# Patient Record
Sex: FEMALE | NOT HISPANIC OR LATINO | Employment: OTHER | URBAN - METROPOLITAN AREA
[De-identification: names, ages, dates, MRNs, and addresses within clinical notes are randomized per-mention and may not be internally consistent; named-entity substitution may affect disease eponyms.]

---

## 2023-03-31 PROBLEM — E78.5 HYPERLIPIDEMIA: Status: ACTIVE | Noted: 2023-03-31

## 2023-04-28 ENCOUNTER — OFFICE VISIT (OUTPATIENT)
Dept: PRIMARY CARE | Facility: CLINIC | Age: 63
End: 2023-04-28
Payer: COMMERCIAL

## 2023-04-28 VITALS
SYSTOLIC BLOOD PRESSURE: 169 MMHG | HEIGHT: 61 IN | WEIGHT: 127 LBS | TEMPERATURE: 96.8 F | BODY MASS INDEX: 23.98 KG/M2 | HEART RATE: 79 BPM | DIASTOLIC BLOOD PRESSURE: 64 MMHG | OXYGEN SATURATION: 98 %

## 2023-04-28 DIAGNOSIS — J43.2 CENTRILOBULAR EMPHYSEMA (MULTI): ICD-10-CM

## 2023-04-28 DIAGNOSIS — E11.29 MICROALBUMINURIA DUE TO TYPE 2 DIABETES MELLITUS (MULTI): Primary | ICD-10-CM

## 2023-04-28 DIAGNOSIS — I73.9 PAD (PERIPHERAL ARTERY DISEASE) (CMS-HCC): ICD-10-CM

## 2023-04-28 DIAGNOSIS — I10 HYPERTENSION, UNSPECIFIED TYPE: ICD-10-CM

## 2023-04-28 DIAGNOSIS — R80.9 MICROALBUMINURIA DUE TO TYPE 2 DIABETES MELLITUS (MULTI): Primary | ICD-10-CM

## 2023-04-28 DIAGNOSIS — E78.2 MIXED HYPERLIPIDEMIA: ICD-10-CM

## 2023-04-28 PROBLEM — E55.9 VITAMIN D DEFICIENCY: Status: ACTIVE | Noted: 2023-04-28

## 2023-04-28 PROBLEM — E78.00 HYPERCHOLESTEROLEMIA: Status: ACTIVE | Noted: 2023-04-28

## 2023-04-28 PROBLEM — J44.9 CHRONIC OBSTRUCTIVE LUNG DISEASE (MULTI): Status: ACTIVE | Noted: 2018-05-10

## 2023-04-28 PROBLEM — F41.8 MIXED ANXIETY DEPRESSIVE DISORDER: Status: ACTIVE | Noted: 2023-04-28

## 2023-04-28 PROBLEM — I25.10 CAD S/P PERCUTANEOUS CORONARY ANGIOPLASTY: Status: ACTIVE | Noted: 2023-04-28

## 2023-04-28 PROBLEM — Z98.61 CAD S/P PERCUTANEOUS CORONARY ANGIOPLASTY: Status: ACTIVE | Noted: 2023-04-28

## 2023-04-28 PROBLEM — G89.29 CHRONIC LOW BACK PAIN: Status: ACTIVE | Noted: 2023-04-28

## 2023-04-28 PROBLEM — M54.50 CHRONIC LOW BACK PAIN: Status: ACTIVE | Noted: 2023-04-28

## 2023-04-28 LAB — POC HEMOGLOBIN A1C: 8 % (ref 4.2–6.5)

## 2023-04-28 PROCEDURE — 1159F MED LIST DOCD IN RCRD: CPT | Performed by: FAMILY MEDICINE

## 2023-04-28 PROCEDURE — 1160F RVW MEDS BY RX/DR IN RCRD: CPT | Performed by: FAMILY MEDICINE

## 2023-04-28 PROCEDURE — 3078F DIAST BP <80 MM HG: CPT | Performed by: FAMILY MEDICINE

## 2023-04-28 PROCEDURE — 1036F TOBACCO NON-USER: CPT | Performed by: FAMILY MEDICINE

## 2023-04-28 PROCEDURE — 3077F SYST BP >= 140 MM HG: CPT | Performed by: FAMILY MEDICINE

## 2023-04-28 PROCEDURE — 3066F NEPHROPATHY DOC TX: CPT | Performed by: FAMILY MEDICINE

## 2023-04-28 PROCEDURE — 99214 OFFICE O/P EST MOD 30 MIN: CPT | Performed by: FAMILY MEDICINE

## 2023-04-28 PROCEDURE — 83036 HEMOGLOBIN GLYCOSYLATED A1C: CPT | Performed by: FAMILY MEDICINE

## 2023-04-28 PROCEDURE — 4010F ACE/ARB THERAPY RXD/TAKEN: CPT | Performed by: FAMILY MEDICINE

## 2023-04-28 RX ORDER — GLIPIZIDE 10 MG/1
10 TABLET ORAL
Qty: 180 TABLET | Refills: 3 | Status: SHIPPED | OUTPATIENT
Start: 2023-04-28 | End: 2024-02-21 | Stop reason: SDUPTHER

## 2023-04-28 RX ORDER — NITROGLYCERIN 0.4 MG/1
TABLET SUBLINGUAL
COMMUNITY
Start: 2014-10-01

## 2023-04-28 RX ORDER — AMLODIPINE BESYLATE 10 MG/1
TABLET ORAL
COMMUNITY
Start: 2018-06-26 | End: 2023-04-28 | Stop reason: SDUPTHER

## 2023-04-28 RX ORDER — GLIPIZIDE 10 MG/1
TABLET ORAL
COMMUNITY
Start: 2014-09-03 | End: 2023-04-28 | Stop reason: SDUPTHER

## 2023-04-28 RX ORDER — ROSUVASTATIN CALCIUM 40 MG/1
40 TABLET, COATED ORAL DAILY
Qty: 90 TABLET | Refills: 3 | Status: SHIPPED | OUTPATIENT
Start: 2023-04-28 | End: 2024-02-21 | Stop reason: SDUPTHER

## 2023-04-28 RX ORDER — LISINOPRIL 20 MG/1
20 TABLET ORAL NIGHTLY
COMMUNITY
End: 2024-02-21 | Stop reason: ALTCHOICE

## 2023-04-28 RX ORDER — BLOOD SUGAR DIAGNOSTIC
STRIP MISCELLANEOUS
COMMUNITY
Start: 2020-02-14

## 2023-04-28 RX ORDER — EMPAGLIFLOZIN AND METFORMIN HYDROCHLORIDE 5; 1000 MG/1; MG/1
1 TABLET ORAL 2 TIMES DAILY
COMMUNITY
Start: 2019-04-01 | End: 2024-01-05

## 2023-04-28 RX ORDER — LISINOPRIL 40 MG/1
40 TABLET ORAL
COMMUNITY
Start: 2017-04-06 | End: 2023-04-28 | Stop reason: SDUPTHER

## 2023-04-28 RX ORDER — LISINOPRIL 40 MG/1
40 TABLET ORAL
Qty: 90 TABLET | Refills: 3 | Status: SHIPPED | OUTPATIENT
Start: 2023-04-28 | End: 2024-02-21 | Stop reason: SDUPTHER

## 2023-04-28 RX ORDER — BLOOD SUGAR DIAGNOSTIC
1 STRIP MISCELLANEOUS 2 TIMES DAILY
COMMUNITY
End: 2023-07-13

## 2023-04-28 RX ORDER — CLOPIDOGREL BISULFATE 75 MG/1
75 TABLET ORAL DAILY
COMMUNITY
End: 2024-01-24

## 2023-04-28 RX ORDER — AMLODIPINE BESYLATE 10 MG/1
10 TABLET ORAL DAILY
Qty: 90 TABLET | Refills: 3 | Status: SHIPPED | OUTPATIENT
Start: 2023-04-28 | End: 2024-02-21 | Stop reason: SDUPTHER

## 2023-04-28 RX ORDER — CARVEDILOL 12.5 MG/1
12.5 TABLET ORAL
Qty: 180 TABLET | Refills: 3 | Status: SHIPPED | OUTPATIENT
Start: 2023-04-28 | End: 2023-09-20 | Stop reason: SDUPTHER

## 2023-04-28 RX ORDER — LANCETS
EACH MISCELLANEOUS 2 TIMES DAILY
COMMUNITY
Start: 2022-07-16

## 2023-04-28 RX ORDER — DULAGLUTIDE 1.5 MG/.5ML
INJECTION, SOLUTION SUBCUTANEOUS
COMMUNITY
Start: 2020-03-31 | End: 2023-08-28 | Stop reason: SDUPTHER

## 2023-04-28 RX ORDER — CARVEDILOL 12.5 MG/1
TABLET ORAL
COMMUNITY
Start: 2018-03-05 | End: 2023-04-28 | Stop reason: SDUPTHER

## 2023-04-28 RX ORDER — ROSUVASTATIN CALCIUM 40 MG/1
40 TABLET, COATED ORAL DAILY
COMMUNITY
End: 2023-04-28 | Stop reason: SDUPTHER

## 2023-04-28 RX ORDER — ASPIRIN 81 MG/1
81 TABLET ORAL
COMMUNITY
Start: 2017-05-09

## 2023-04-28 ASSESSMENT — LIFESTYLE VARIABLES
HOW MANY STANDARD DRINKS CONTAINING ALCOHOL DO YOU HAVE ON A TYPICAL DAY: PATIENT DOES NOT DRINK
AUDIT-C TOTAL SCORE: 0
HOW OFTEN DO YOU HAVE SIX OR MORE DRINKS ON ONE OCCASION: NEVER
HOW OFTEN DO YOU HAVE A DRINK CONTAINING ALCOHOL: NEVER
SKIP TO QUESTIONS 9-10: 1

## 2023-04-28 ASSESSMENT — PATIENT HEALTH QUESTIONNAIRE - PHQ9
SUM OF ALL RESPONSES TO PHQ9 QUESTIONS 1 AND 2: 0
1. LITTLE INTEREST OR PLEASURE IN DOING THINGS: NOT AT ALL
2. FEELING DOWN, DEPRESSED OR HOPELESS: NOT AT ALL

## 2023-04-28 NOTE — PROGRESS NOTES
Subjective   Patient ID: Merissa English is a 63 y.o. female who presents for Results.    Assessment/Plan     Problem List Items Addressed This Visit          Respiratory    Chronic obstructive lung disease (CMS/HCC)       Circulatory    Hypertension    Relevant Medications    carvedilol (Coreg) 12.5 mg tablet    lisinopril 40 mg tablet    amLODIPine (Norvasc) 10 mg tablet    PAD (peripheral artery disease) (CMS/HCC)     Asymptomatic we will continue aspirin Plavix statin -hold off vascular surgery referral            Endocrine/Metabolic    Microalbuminuria due to type 2 diabetes mellitus (CMS/[facility]) - Primary    Relevant Medications    glipiZIDE (Glucotrol) 10 mg tablet    Other Relevant Orders    POCT glycosylated hemoglobin (Hb A1C) manually resulted       Other    Hyperlipidemia    Relevant Medications    rosuvastatin (Crestor) 40 mg tablet     Physical done in December 2022  Prevnar 20 previous visit  Repeat Cologuard patient never did  CT chest done in April 2023 reviewed stable finding of centrilobular emphysema  Mammogram in June 2021 within normal limits  New shingles vaccine prescription provided on previous visit  GI information provided 4 EGD colonoscopy patient did not go       Discussed about healthy lifestyle diet exercise  Continue current medication  Advised to follow-up with OB/GYN for Pap smear  Advised to follow-up with ophthalmology  I'll up in 3 months  Received flu vaccine     Schedule follow-up with OB/GYN for Pap smear    HPI    63-year-old female here for follow-up on     MARY reviewed showed  Right 0.76  Left 0.62    Discussed in detail patient is asymptomatic  Hemoglobin A1c 8.0 again discussed about lifestyle modification we will continue current treatment follow-up in 3 months if it is not well controlled increase Trulicity  Patient agrees     NSTEMi.  on aspirin, statin, beta blocker, lisinopril , plavix.   Echo showed ejection fraction 30-35 percent with lateral and apical wall akinesis in  2/18 - recent echo showed ef 50-55% - Lasix has been discontinued by patient - no edema no shortness of breath at this point     Cardiac catheter revealed status post mild LAD SAMUEL - on 2/27/2018,  hypertension. Continue carvedilol and lisinopril.  3. Hyperlipidemia. Continue Crestor 40 mg with fenofibrate.  4. Diabetes. cont metformin. Continue glipizide and synjardy and Trulicity  5. Generalized anxiety disorder, was on p.r.n. Xanax.       Hemoglobin A1c 7.7 on previous visit we will check today 8.0.  History of smoking more than 30 pack per year stopped in 2017     Patient stopped smoking  No hypoglycemia     Chronic constipation  Possible COPD emphysema  Anxiety depression  Cataract  Hearing loss  Wearing dentures  Patient never had a colonoscopy     No Known Allergies    Current Outpatient Medications   Medication Sig Dispense Refill    Accu-Chek Fastclix Lancet Drum misc 2 times a day.      Accu-Chek Guide test strips strip 1 strip 2 times a day.      aspirin 81 mg EC tablet Take 1 tablet (81 mg) by mouth once daily.      blood sugar diagnostic (Accu-Chek Nina Plus test strp) strip once daily.      clopidogrel (Plavix) 75 mg tablet Take 1 tablet (75 mg) by mouth once daily.      fenofibrate (Tricor) 145 mg tablet TAKE ONE TABLET BY MOUTH DAILY 90 tablet 1    lisinopril 20 mg tablet Take 1 tablet (20 mg) by mouth once daily at bedtime.      nitroglycerin (Nitrostat) 0.4 mg SL tablet Place under the tongue.      Synjardy 5-1,000 mg Take 1 tablet by mouth twice a day.      Trulicity 1.5 mg/0.5 mL pen injector Inject under the skin.      amLODIPine (Norvasc) 10 mg tablet Take 1 tablet (10 mg) by mouth once daily. 90 tablet 3    carvedilol (Coreg) 12.5 mg tablet Take 1 tablet (12.5 mg) by mouth 2 times a day with meals. 180 tablet 3    glipiZIDE (Glucotrol) 10 mg tablet Take 1 tablet (10 mg) by mouth 2 times a day before meals. 180 tablet 3    lisinopril 40 mg tablet Take 1 tablet (40 mg) by mouth once daily. 90  "tablet 3    rosuvastatin (Crestor) 40 mg tablet Take 1 tablet (40 mg) by mouth once daily. 90 tablet 3     No current facility-administered medications for this visit.       Objective   Visit Vitals  /64 (BP Location: Left arm, Patient Position: Sitting)   Pulse 79   Temp 36 °C (96.8 °F)   Ht 1.549 m (5' 1\")   Wt 57.6 kg (127 lb)   SpO2 98%   BMI 24.00 kg/m²   Smoking Status Former   BSA 1.57 m²     Physical Exam  Cardiovascular:      Pulses:           Dorsalis pedis pulses are 0 on the right side and 0 on the left side.        Posterior tibial pulses are 0 on the right side and 0 on the left side.   Musculoskeletal:      Right foot: Normal range of motion. No deformity, bunion or foot drop.      Left foot: Normal range of motion. No deformity, bunion or foot drop.   Feet:      Right foot:      Protective Sensation: 5 sites tested.  5 sites sensed.      Skin integrity: Skin integrity normal.      Toenail Condition: Right toenails are normal.      Left foot:      Protective Sensation: 5 sites tested.  5 sites sensed.      Skin integrity: Skin integrity normal.      Toenail Condition: Left toenails are normal.       Constitutional:       General: He is not in acute distress.     Appearance: Normal appearance.   HENT:      Head: Normocephalic and atraumatic.      Nose: Nose normal.   Eyes:      Extraocular Movements: Extraocular movements intact.      Conjunctiva/sclera: Conjunctivae normal.   Cardiovascular:      Rate and Rhythm: Normal rate and regular rhythm.   Pulmonary:      Effort: Pulmonary effort is normal.      Breath sounds: Normal breath sounds.   Skin:     General: Skin is warm.   Neurological:      Mental Status: He is alert and oriented to person, place, and time.   Psychiatric:         Mood and Affect: Mood normal.         Behavior: Behavior normal.   Immunization History   Administered Date(s) Administered    Moderna SARS-CoV-2 Vaccination 03/28/2021, 04/26/2021, 12/15/2021       Review of " Systems    No visits with results within 4 Month(s) from this visit.   Latest known visit with results is:   Legacy Encounter on 12/28/2022   Component Date Value Ref Range Status    TSH 12/28/2022 1.36  0.44 - 3.98 mIU/L Final    Cholesterol 12/28/2022 110  0 - 199 mg/dL Final    HDL 12/28/2022 35.9 (A)  mg/dL Final    Cholesterol/HDL Ratio 12/28/2022 3.1   Final    LDL 12/28/2022 41  0 - 99 mg/dL Final    VLDL 12/28/2022 34  0 - 40 mg/dL Final    Triglycerides 12/28/2022 168 (H)  0 - 149 mg/dL Final    Albumin 12/28/2022 4.0  3.4 - 5.0 g/dL Final    Total Bilirubin 12/28/2022 0.3  0.0 - 1.2 mg/dL Final    Bilirubin, Direct 12/28/2022 0.1  0.0 - 0.3 mg/dL Final    Alkaline Phosphatase 12/28/2022 41  33 - 136 U/L Final    ALT (SGPT) 12/28/2022 13  7 - 45 U/L Final    AST 12/28/2022 17  9 - 39 U/L Final    Total Protein 12/28/2022 6.9  6.4 - 8.2 g/dL Final       Radiology: Reviewed imaging in powerchart.  CT lung screening low dose    Result Date: 4/3/2023  CT  OF THE CHEST  CLINICAL INDICATION:  Former smoker. Screening. FINDINGS:  Serial axial images from the thoracic inlet to the upper abdomen were obtained without administration of contrast with low-dose screen technique.   Comparison is made to prior CT obtained on 7/6/2022. Coronal and sagittal reformatted images were generated from the axial data set. This exam was performed according to our departmental dose optimization program and includes the following measures when applicable: Automated exposure control, adjustment of the MAS and or KVP according to the patient size and are exam and an iterative reconstruction algorithm. The heart and mediastinal vascular structures are  again remarkable for the presence of calcified plaques in the coronary arteries and thoracic aorta..  There are no axillary, hilar or mediastinal abnormally enlarged lymph nodes.  There is no evidence of mediastinal masses. Again the lungs are hyperexpanded and hyperlucent with flattening  of the diaphragms and increased retrosternal space. . Small focal area of subsegmental atelectasis in the posterior right lung base is not identified.  There is no evidence of  pleural effusion.  The pulmonary vasculature is normal.  There are no intrapulmonary nodules or masses.  The airways are normal. However, the right lower lobe posterior basal segmental bronchi is completely filled and occluded most likely representing a mucous plug. This finding was not present on previous CT. Within the limitations of the exam the visualize segment of the upper abdomen is unremarkable. No evidence of osteoblastic or osteolytic lesions is identified in bones of the chest. Again mild osteoarthritic changes of the thoracic spine are noted. IMPRESSION: Stable findings of centrilobular emphysema. Occlusion of the posterior basal segmental bronchus of the right lower lobe most likely representing mucous plug. There is secondary small focal area of subsegmental atelectasis at the lung base. Clinical correlation is suggested to determine the significance of this finding. Stable calcified atherosclerosis of the coronary arteries and thoracic aorta. Nodules seen on previous CT in the right lower lobe, is no longer seen. Lung RADS category 1. Recommendations: Continue yearly screening with LD CT Electronically signed by:  Taylor Garay MD  4/5/2023 9:38 AM CDT Workstation: [id number] Technologist:  EMIGDIO DIETZ Dictated By:   TAYLOR GARAY MD Signed By:     TAYLOR GARAY MD Signed Out:    04/05/23 10:38:56      No family history on file.  Social History     Socioeconomic History    Marital status:      Spouse name: None    Number of children: None    Years of education: None    Highest education level: None   Occupational History    None   Tobacco Use    Smoking status: Former     Packs/day: 1.50     Years: 15.00     Pack years: 22.50     Types: Cigarettes    Smokeless tobacco: Never   Vaping Use    Vaping status: None   Substance  and Sexual Activity    Alcohol use: Never    Drug use: Never    Sexual activity: None   Other Topics Concern    None   Social History Narrative    None     Social Determinants of Health     Financial Resource Strain: Not on file   Food Insecurity: Not on file   Transportation Needs: Not on file   Physical Activity: Not on file   Stress: Not on file   Social Connections: Not on file   Intimate Partner Violence: Not on file   Housing Stability: Not on file     Past Medical History:   Diagnosis Date    Unspecified systolic (congestive) heart failure (CMS/HCC) 2018    Systolic CHF     Past Surgical History:   Procedure Laterality Date    CATARACT EXTRACTION  2016    Cataract Surgery     SECTION, CLASSIC  10/29/2014     Section    CORONARY ANGIOPLASTY WITH STENT PLACEMENT  2018    Cath Placement Of Stent 1    LYMPH NODE BIOPSY  2016    Biopsy Lymph Node       Charting was completed using voice recognition technology and may include unintended errors.

## 2023-07-05 DIAGNOSIS — R80.9 MICROALBUMINURIA DUE TO TYPE 2 DIABETES MELLITUS (MULTI): ICD-10-CM

## 2023-07-05 DIAGNOSIS — E11.29 MICROALBUMINURIA DUE TO TYPE 2 DIABETES MELLITUS (MULTI): ICD-10-CM

## 2023-07-13 RX ORDER — BLOOD SUGAR DIAGNOSTIC
STRIP MISCELLANEOUS
Qty: 200 STRIP | Refills: 3 | Status: SHIPPED | OUTPATIENT
Start: 2023-07-13 | End: 2023-07-21

## 2023-07-19 DIAGNOSIS — E11.29 MICROALBUMINURIA DUE TO TYPE 2 DIABETES MELLITUS (MULTI): ICD-10-CM

## 2023-07-19 DIAGNOSIS — R80.9 MICROALBUMINURIA DUE TO TYPE 2 DIABETES MELLITUS (MULTI): ICD-10-CM

## 2023-07-21 RX ORDER — BLOOD SUGAR DIAGNOSTIC
STRIP MISCELLANEOUS
Qty: 100 STRIP | Refills: 3 | Status: SHIPPED | OUTPATIENT
Start: 2023-07-21

## 2023-07-28 ENCOUNTER — APPOINTMENT (OUTPATIENT)
Dept: PRIMARY CARE | Facility: CLINIC | Age: 63
End: 2023-07-28
Payer: COMMERCIAL

## 2023-08-02 ENCOUNTER — OFFICE VISIT (OUTPATIENT)
Dept: PRIMARY CARE | Facility: CLINIC | Age: 63
End: 2023-08-02
Payer: COMMERCIAL

## 2023-08-02 VITALS
HEIGHT: 61 IN | DIASTOLIC BLOOD PRESSURE: 70 MMHG | BODY MASS INDEX: 23.41 KG/M2 | OXYGEN SATURATION: 97 % | WEIGHT: 124 LBS | SYSTOLIC BLOOD PRESSURE: 169 MMHG | TEMPERATURE: 96.9 F | HEART RATE: 73 BPM

## 2023-08-02 DIAGNOSIS — Z98.61 CAD S/P PERCUTANEOUS CORONARY ANGIOPLASTY: ICD-10-CM

## 2023-08-02 DIAGNOSIS — I10 BENIGN ESSENTIAL HYPERTENSION: ICD-10-CM

## 2023-08-02 DIAGNOSIS — I25.10 CAD S/P PERCUTANEOUS CORONARY ANGIOPLASTY: ICD-10-CM

## 2023-08-02 DIAGNOSIS — E11.29 MICROALBUMINURIA DUE TO TYPE 2 DIABETES MELLITUS (MULTI): Primary | ICD-10-CM

## 2023-08-02 DIAGNOSIS — R80.9 MICROALBUMINURIA DUE TO TYPE 2 DIABETES MELLITUS (MULTI): Primary | ICD-10-CM

## 2023-08-02 DIAGNOSIS — I73.9 PAD (PERIPHERAL ARTERY DISEASE) (CMS-HCC): ICD-10-CM

## 2023-08-02 LAB — POC HEMOGLOBIN A1C: 7.6 % (ref 4.2–6.5)

## 2023-08-02 PROCEDURE — 4010F ACE/ARB THERAPY RXD/TAKEN: CPT | Performed by: FAMILY MEDICINE

## 2023-08-02 PROCEDURE — 3066F NEPHROPATHY DOC TX: CPT | Performed by: FAMILY MEDICINE

## 2023-08-02 PROCEDURE — 99214 OFFICE O/P EST MOD 30 MIN: CPT | Performed by: FAMILY MEDICINE

## 2023-08-02 PROCEDURE — 1160F RVW MEDS BY RX/DR IN RCRD: CPT | Performed by: FAMILY MEDICINE

## 2023-08-02 PROCEDURE — 1036F TOBACCO NON-USER: CPT | Performed by: FAMILY MEDICINE

## 2023-08-02 PROCEDURE — 1159F MED LIST DOCD IN RCRD: CPT | Performed by: FAMILY MEDICINE

## 2023-08-02 PROCEDURE — 83036 HEMOGLOBIN GLYCOSYLATED A1C: CPT | Performed by: FAMILY MEDICINE

## 2023-08-02 PROCEDURE — 3078F DIAST BP <80 MM HG: CPT | Performed by: FAMILY MEDICINE

## 2023-08-02 PROCEDURE — 3077F SYST BP >= 140 MM HG: CPT | Performed by: FAMILY MEDICINE

## 2023-08-02 RX ORDER — IRON POLYSACCHARIDE COMPLEX 150 MG
CAPSULE ORAL
COMMUNITY
Start: 2018-09-27 | End: 2024-01-18

## 2023-08-02 ASSESSMENT — LIFESTYLE VARIABLES
HOW OFTEN DO YOU HAVE SIX OR MORE DRINKS ON ONE OCCASION: NEVER
HOW MANY STANDARD DRINKS CONTAINING ALCOHOL DO YOU HAVE ON A TYPICAL DAY: PATIENT DOES NOT DRINK
HOW OFTEN DO YOU HAVE A DRINK CONTAINING ALCOHOL: NEVER
SKIP TO QUESTIONS 9-10: 1
AUDIT-C TOTAL SCORE: 0

## 2023-08-02 NOTE — PROGRESS NOTES
Subjective   Patient ID: Merissa English is a 63 y.o. female who presents for Follow-up and Diabetes.    Assessment/Plan     Problem List Items Addressed This Visit       Benign essential hypertension    CAD S/P percutaneous coronary angioplasty    Microalbuminuria due to type 2 diabetes mellitus (CMS/HCC) - Primary    Relevant Orders    POCT glycosylated hemoglobin (Hb A1C) manually resulted (Completed)    PAD (peripheral artery disease) (CMS/[facility])   Physical done in December 2022  Prevnar 20 previous visit  Cologuard was negative 4/23  CT chest done in April 2023 reviewed stable finding of centrilobular emphysema  Mammogram in June 2021 within normal limits  New shingles vaccine prescription provided on previous visit  GI information provided 4 EGD colonoscopy patient did not go       Discussed about healthy lifestyle diet exercise  Continue current medication  Advised to follow-up with OB/GYN for Pap smear  Advised to follow-up with ophthalmology  I'll up in 3 months  Received flu vaccine     Schedule follow-up with OB/GYN for Pap smear    HPI    63-year-old female here for follow-up on     MARY reviewed showed  Right 0.76  Left 0.62    Discussed in detail patient is asymptomatic - med mx - no ref for now  Hemoglobin A1c 8.0-->7.6 again discussed about lifestyle modification we will continue current treatment follow-up in 3 months if it is not well controlled increase Trulicity  Patient agrees    Reminded her to see opthal     NSTEMi.  on aspirin, statin, beta blocker, lisinopril , plavix.   Echo showed ejection fraction 30-35 percent with lateral and apical wall akinesis in 2/18 - recent echo showed ef 50-55% - Lasix has been discontinued by patient - no edema no shortness of breath at this point     Cardiac catheter revealed status post mild LAD SAMUEL - on 2/27/2018,  hypertension. Continue carvedilol and lisinopril.  3. Hyperlipidemia. Continue Crestor 40 mg with fenofibrate.  4. Diabetes. cont metformin. Continue  "glipizide and synjardy and Trulicity  5. Generalized anxiety disorder, was on p.r.n. Xanax.      History of smoking more than 30 pack per year stopped in 2017     No hypoglycemia     Chronic constipation  Possible COPD emphysema  Anxiety depression  Cataract  Hearing loss  Wearing dentures       No Known Allergies    Current Outpatient Medications   Medication Sig Dispense Refill    Accu-Chek Fastclix Lancet Drum misc 2 times a day.      Accu-Chek Guide test strips strip USE 1 STRIP TWICE DAILY 100 strip 3    amLODIPine (Norvasc) 10 mg tablet Take 1 tablet (10 mg) by mouth once daily. 90 tablet 3    aspirin 81 mg EC tablet Take 1 tablet (81 mg) by mouth once daily.      blood sugar diagnostic (Accu-Chek Nina Plus test strp) strip once daily.      carvedilol (Coreg) 12.5 mg tablet Take 1 tablet (12.5 mg) by mouth 2 times a day with meals. 180 tablet 3    clopidogrel (Plavix) 75 mg tablet Take 1 tablet (75 mg) by mouth once daily.      fenofibrate (Tricor) 145 mg tablet TAKE ONE TABLET BY MOUTH DAILY 90 tablet 1    glipiZIDE (Glucotrol) 10 mg tablet Take 1 tablet (10 mg) by mouth 2 times a day before meals. 180 tablet 3    iron polysaccharides (Nu-Iron,Niferex) 150 mg iron capsule Take by mouth.      lisinopril 20 mg tablet Take 1 tablet (20 mg) by mouth once daily at bedtime.      lisinopril 40 mg tablet Take 1 tablet (40 mg) by mouth once daily. 90 tablet 3    nitroglycerin (Nitrostat) 0.4 mg SL tablet Place under the tongue.      rosuvastatin (Crestor) 40 mg tablet Take 1 tablet (40 mg) by mouth once daily. 90 tablet 3    Synjardy 5-1,000 mg Take 1 tablet by mouth twice a day.      Trulicity 1.5 mg/0.5 mL pen injector Inject under the skin.       No current facility-administered medications for this visit.       Objective   Visit Vitals  /70 (BP Location: Left arm, Patient Position: Sitting)   Pulse 73   Temp 36.1 °C (96.9 °F)   Ht 1.549 m (5' 1\")   Wt 56.2 kg (124 lb)   SpO2 97%   BMI 23.43 kg/m²   Smoking " Status Former   BSA 1.56 m²     Physical Exam  Cardiovascular:      Pulses:           Dorsalis pedis pulses are 0 on the right side and 0 on the left side.        Posterior tibial pulses are 0 on the right side and 0 on the left side.   Musculoskeletal:      Right foot: Normal range of motion. No deformity, bunion or foot drop.      Left foot: Normal range of motion. No deformity, bunion or foot drop.   Feet:      Right foot:      Protective Sensation: 5 sites tested.  5 sites sensed.      Skin integrity: Skin integrity normal.      Toenail Condition: Right toenails are normal.      Left foot:      Protective Sensation: 5 sites tested.  5 sites sensed.      Skin integrity: Skin integrity normal.      Toenail Condition: Left toenails are normal.       Constitutional:       General: He is not in acute distress.     Appearance: Normal appearance.   HENT:      Head: Normocephalic and atraumatic.      Nose: Nose normal.   Eyes:      Extraocular Movements: Extraocular movements intact.      Conjunctiva/sclera: Conjunctivae normal.   Cardiovascular:      Rate and Rhythm: Normal rate and regular rhythm.   Pulmonary:      Effort: Pulmonary effort is normal.      Breath sounds: Normal breath sounds.   Skin:     General: Skin is warm.   Neurological:      Mental Status: He is alert and oriented to person, place, and time.   Psychiatric:         Mood and Affect: Mood normal.         Behavior: Behavior normal.   Immunization History   Administered Date(s) Administered    Moderna SARS-CoV-2 Vaccination 03/28/2021, 04/26/2021, 12/15/2021       Review of Systems    Office Visit on 08/02/2023   Component Date Value Ref Range Status    POC HEMOGLOBIN A1c 08/02/2023 7.6 (A)  4.2 - 6.5 % Final   Office Visit on 04/28/2023   Component Date Value Ref Range Status    POC HEMOGLOBIN A1c 04/28/2023 8.0 (A)  4.2 - 6.5 % Final       Radiology: Reviewed imaging in powerchart.  CT lung screening low dose    Result Date: 4/3/2023  CT  OF THE  CHEST  CLINICAL INDICATION:  Former smoker. Screening. FINDINGS:  Serial axial images from the thoracic inlet to the upper abdomen were obtained without administration of contrast with low-dose screen technique.   Comparison is made to prior CT obtained on 7/6/2022. Coronal and sagittal reformatted images were generated from the axial data set. This exam was performed according to our departmental dose optimization program and includes the following measures when applicable: Automated exposure control, adjustment of the MAS and or KVP according to the patient size and are exam and an iterative reconstruction algorithm. The heart and mediastinal vascular structures are  again remarkable for the presence of calcified plaques in the coronary arteries and thoracic aorta..  There are no axillary, hilar or mediastinal abnormally enlarged lymph nodes.  There is no evidence of mediastinal masses. Again the lungs are hyperexpanded and hyperlucent with flattening of the diaphragms and increased retrosternal space. . Small focal area of subsegmental atelectasis in the posterior right lung base is not identified.  There is no evidence of  pleural effusion.  The pulmonary vasculature is normal.  There are no intrapulmonary nodules or masses.  The airways are normal. However, the right lower lobe posterior basal segmental bronchi is completely filled and occluded most likely representing a mucous plug. This finding was not present on previous CT. Within the limitations of the exam the visualize segment of the upper abdomen is unremarkable. No evidence of osteoblastic or osteolytic lesions is identified in bones of the chest. Again mild osteoarthritic changes of the thoracic spine are noted. IMPRESSION: Stable findings of centrilobular emphysema. Occlusion of the posterior basal segmental bronchus of the right lower lobe most likely representing mucous plug. There is secondary small focal area of subsegmental atelectasis at the  lung base. Clinical correlation is suggested to determine the significance of this finding. Stable calcified atherosclerosis of the coronary arteries and thoracic aorta. Nodules seen on previous CT in the right lower lobe, is no longer seen. Lung RADS category 1. Recommendations: Continue yearly screening with LD CT Electronically signed by:  Taylor Garay MD  2023 9:38 AM CDT Workstation: [id number] Technologist:  EMIGDIO DIETZ Dictated By:   TAYLOR GARAY MD Signed By:     TAYLOR GARAY MD Signed Out:    23 10:38:56      No family history on file.  Social History     Socioeconomic History    Marital status:      Spouse name: None    Number of children: None    Years of education: None    Highest education level: None   Occupational History    None   Tobacco Use    Smoking status: Former     Packs/day: 1.50     Years: 15.00     Total pack years: 22.50     Types: Cigarettes    Smokeless tobacco: Never   Substance and Sexual Activity    Alcohol use: Never    Drug use: Never    Sexual activity: None   Other Topics Concern    None   Social History Narrative    None     Social Determinants of Health     Financial Resource Strain: Not on file   Food Insecurity: Not on file   Transportation Needs: Not on file   Physical Activity: Not on file   Stress: Not on file   Social Connections: Not on file   Intimate Partner Violence: Not on file   Housing Stability: Not on file     Past Medical History:   Diagnosis Date    Unspecified systolic (congestive) heart failure (CMS/HCC) 2018    Systolic CHF     Past Surgical History:   Procedure Laterality Date    CATARACT EXTRACTION  2016    Cataract Surgery     SECTION, CLASSIC  10/29/2014     Section    CORONARY ANGIOPLASTY WITH STENT PLACEMENT  2018    Cath Placement Of Stent 1    LYMPH NODE BIOPSY  2016    Biopsy Lymph Node       Charting was completed using voice recognition technology and may include unintended errors.

## 2023-08-28 DIAGNOSIS — R80.9 MICROALBUMINURIA DUE TO TYPE 2 DIABETES MELLITUS (MULTI): ICD-10-CM

## 2023-08-28 DIAGNOSIS — E11.29 MICROALBUMINURIA DUE TO TYPE 2 DIABETES MELLITUS (MULTI): ICD-10-CM

## 2023-08-29 RX ORDER — DULAGLUTIDE 1.5 MG/.5ML
1.5 INJECTION, SOLUTION SUBCUTANEOUS
Qty: 12 EACH | Refills: 2 | Status: SHIPPED | OUTPATIENT
Start: 2023-08-29 | End: 2024-05-21

## 2023-09-20 DIAGNOSIS — E78.5 HYPERLIPIDEMIA, UNSPECIFIED HYPERLIPIDEMIA TYPE: ICD-10-CM

## 2023-09-20 DIAGNOSIS — I10 HYPERTENSION, UNSPECIFIED TYPE: ICD-10-CM

## 2023-09-21 ENCOUNTER — TELEPHONE (OUTPATIENT)
Dept: PRIMARY CARE | Facility: CLINIC | Age: 63
End: 2023-09-21
Payer: COMMERCIAL

## 2023-09-21 DIAGNOSIS — I10 HYPERTENSION, UNSPECIFIED TYPE: ICD-10-CM

## 2023-09-21 RX ORDER — CARVEDILOL 12.5 MG/1
12.5 TABLET ORAL
Qty: 180 TABLET | Refills: 2 | Status: SHIPPED | OUTPATIENT
Start: 2023-09-21 | End: 2023-09-21 | Stop reason: SDUPTHER

## 2023-09-21 RX ORDER — CARVEDILOL 12.5 MG/1
12.5 TABLET ORAL
Qty: 180 TABLET | Refills: 2 | Status: SHIPPED | OUTPATIENT
Start: 2023-09-21 | End: 2024-05-16 | Stop reason: SDUPTHER

## 2023-09-21 RX ORDER — FENOFIBRATE 145 MG/1
TABLET, FILM COATED ORAL
Qty: 90 TABLET | Refills: 2 | Status: SHIPPED | OUTPATIENT
Start: 2023-09-21

## 2023-10-09 ENCOUNTER — TELEMEDICINE (OUTPATIENT)
Dept: PRIMARY CARE | Facility: CLINIC | Age: 63
End: 2023-10-09
Payer: COMMERCIAL

## 2023-10-09 VITALS — BODY MASS INDEX: 22.84 KG/M2 | HEIGHT: 61 IN | WEIGHT: 121 LBS | TEMPERATURE: 100 F

## 2023-10-09 DIAGNOSIS — U07.1 COVID-19 VIRUS DETECTED: ICD-10-CM

## 2023-10-09 DIAGNOSIS — R05.1 ACUTE COUGH: Primary | ICD-10-CM

## 2023-10-09 PROCEDURE — 99213 OFFICE O/P EST LOW 20 MIN: CPT | Performed by: FAMILY MEDICINE

## 2023-10-09 NOTE — PROGRESS NOTES
0Subjective   Patient ID: Merissa English is a 63 y.o. female who presents for Covid-19 Home Monitoring Video Visit (Cough,achey,fever sxs started last night /Pt  also has covid).    Assessment/Plan     Problem List Items Addressed This Visit    None  Visit Diagnoses       Acute cough    -  Primary    Relevant Medications    nirmatrelvir-ritonavir (PAXLOVID) 300 mg (150 mg x 2)-100 mg tablet therapy pack    COVID-19 virus detected        Relevant Medications    nirmatrelvir-ritonavir (PAXLOVID) 300 mg (150 mg x 2)-100 mg tablet therapy pack            HPI    This visit was completed via VIRTUAL VIDEO/Audio due to the restrictions of the COVID-19 pandemic. All issues as below were discussed and addressed but no physical exam was performed. If it was felt that the patient should be evaluated in clinic then they were directed there. The patient verbally consented to visit.     Patient was not able to do the video call so phone call was made    63-year-old female complaining of cough congestion phlegm production runny nose sore throat fever started yesterday fever was more than 100 degrees  Denies any chest pain or shortness of breath no nausea vomiting diarrhea  Patient's  was also positive for COVID-19 infection  Complaining of phlegm production    Taking Paxil discussed  Hold statin  Monitor oxygenation if worsening let us know  Stay hydrated  Follow-up in the office if symptoms are worsening    No Known Allergies    Current Outpatient Medications   Medication Sig Dispense Refill    Accu-Chek Fastclix Lancet Drum misc 2 times a day.      Accu-Chek Guide test strips strip USE 1 STRIP TWICE DAILY 100 strip 3    amLODIPine (Norvasc) 10 mg tablet Take 1 tablet (10 mg) by mouth once daily. 90 tablet 3    aspirin 81 mg EC tablet Take 1 tablet (81 mg) by mouth once daily.      blood sugar diagnostic (Accu-Chek Nina Plus test strp) strip once daily.      carvedilol (Coreg) 12.5 mg tablet Take 1 tablet (12.5 mg)  "by mouth 2 times a day with meals. 180 tablet 2    dulaglutide (Trulicity) 1.5 mg/0.5 mL pen injector injection Inject 1.5 mg under the skin 1 (one) time per week. 12 each 2    fenofibrate (Tricor) 145 mg tablet TAKE ONE TABLET BY MOUTH DAILY 90 tablet 2    glipiZIDE (Glucotrol) 10 mg tablet Take 1 tablet (10 mg) by mouth 2 times a day before meals. 180 tablet 3    iron polysaccharides (Nu-Iron,Niferex) 150 mg iron capsule Take by mouth.      lisinopril 20 mg tablet Take 1 tablet (20 mg) by mouth once daily at bedtime.      lisinopril 40 mg tablet Take 1 tablet (40 mg) by mouth once daily. 90 tablet 3    nitroglycerin (Nitrostat) 0.4 mg SL tablet Place under the tongue.      rosuvastatin (Crestor) 40 mg tablet Take 1 tablet (40 mg) by mouth once daily. 90 tablet 3    Synjardy 5-1,000 mg Take 1 tablet by mouth twice a day.      clopidogrel (Plavix) 75 mg tablet Take 1 tablet (75 mg) by mouth once daily.      nirmatrelvir-ritonavir (PAXLOVID) 300 mg (150 mg x 2)-100 mg tablet therapy pack Take 3 tablets by mouth 2 times a day for 5 days. Follow the instructions on the package 30 tablet 0     No current facility-administered medications for this visit.       Objective   Visit Vitals  Temp 37.8 °C (100 °F)   Ht 1.549 m (5' 1\")   Wt 54.9 kg (121 lb)   BMI 22.86 kg/m²   Smoking Status Former   BSA 1.54 m²     Physical Exam    Immunization History   Administered Date(s) Administered    Moderna SARS-CoV-2 Vaccination 03/28/2021, 04/26/2021, 12/15/2021       Review of Systems    Office Visit on 08/02/2023   Component Date Value Ref Range Status    POC HEMOGLOBIN A1c 08/02/2023 7.6 (A)  4.2 - 6.5 % Final       Radiology: Reviewed imaging in powerchart.  No results found.    No family history on file.  Social History     Socioeconomic History    Marital status:      Spouse name: None    Number of children: None    Years of education: None    Highest education level: None   Occupational History    None   Tobacco Use    " Smoking status: Former     Packs/day: 1.50     Years: 15.00     Additional pack years: 0.00     Total pack years: 22.50     Types: Cigarettes    Smokeless tobacco: Never   Substance and Sexual Activity    Alcohol use: Never    Drug use: Never    Sexual activity: None   Other Topics Concern    None   Social History Narrative    None     Social Determinants of Health     Financial Resource Strain: Not on file   Food Insecurity: Not on file   Transportation Needs: Not on file   Physical Activity: Not on file   Stress: Not on file   Social Connections: Not on file   Intimate Partner Violence: Not on file   Housing Stability: Not on file     Past Medical History:   Diagnosis Date    Unspecified systolic (congestive) heart failure (CMS/[facility]) 2018    Systolic CHF     Past Surgical History:   Procedure Laterality Date    CATARACT EXTRACTION  2016    Cataract Surgery     SECTION, CLASSIC  10/29/2014     Section    CORONARY ANGIOPLASTY WITH STENT PLACEMENT  2018    Cath Placement Of Stent 1    LYMPH NODE BIOPSY  2016    Biopsy Lymph Node       Charting was completed using voice recognition technology and may include unintended errors.

## 2024-01-05 DIAGNOSIS — E11.29 MICROALBUMINURIA DUE TO TYPE 2 DIABETES MELLITUS (MULTI): ICD-10-CM

## 2024-01-05 DIAGNOSIS — R80.9 MICROALBUMINURIA DUE TO TYPE 2 DIABETES MELLITUS (MULTI): ICD-10-CM

## 2024-01-05 RX ORDER — EMPAGLIFLOZIN AND METFORMIN HYDROCHLORIDE 5; 1000 MG/1; MG/1
1 TABLET ORAL 2 TIMES DAILY
Qty: 180 TABLET | Refills: 1 | Status: SHIPPED | OUTPATIENT
Start: 2024-01-05 | End: 2024-02-21 | Stop reason: SDUPTHER

## 2024-01-13 DIAGNOSIS — D50.9 IRON DEFICIENCY ANEMIA, UNSPECIFIED IRON DEFICIENCY ANEMIA TYPE: Primary | ICD-10-CM

## 2024-01-18 RX ORDER — IRON POLYSACCHARIDE COMPLEX 150 MG
150 CAPSULE ORAL 2 TIMES DAILY
Qty: 180 CAPSULE | Refills: 1 | Status: SHIPPED | OUTPATIENT
Start: 2024-01-18

## 2024-01-22 DIAGNOSIS — I73.9 PAD (PERIPHERAL ARTERY DISEASE) (CMS-HCC): ICD-10-CM

## 2024-01-24 RX ORDER — CLOPIDOGREL BISULFATE 75 MG/1
75 TABLET ORAL DAILY
Qty: 90 TABLET | Refills: 0 | Status: SHIPPED | OUTPATIENT
Start: 2024-01-24 | End: 2024-02-21 | Stop reason: SDUPTHER

## 2024-02-21 ENCOUNTER — OFFICE VISIT (OUTPATIENT)
Dept: PRIMARY CARE | Facility: CLINIC | Age: 64
End: 2024-02-21
Payer: COMMERCIAL

## 2024-02-21 ENCOUNTER — HOSPITAL ENCOUNTER (OUTPATIENT)
Dept: RADIOLOGY | Facility: EXTERNAL LOCATION | Age: 64
Discharge: HOME | End: 2024-02-21

## 2024-02-21 VITALS
TEMPERATURE: 96.7 F | SYSTOLIC BLOOD PRESSURE: 160 MMHG | HEART RATE: 71 BPM | HEIGHT: 61 IN | DIASTOLIC BLOOD PRESSURE: 70 MMHG | WEIGHT: 125 LBS | OXYGEN SATURATION: 96 % | BODY MASS INDEX: 23.6 KG/M2

## 2024-02-21 DIAGNOSIS — J43.2 CENTRILOBULAR EMPHYSEMA (MULTI): ICD-10-CM

## 2024-02-21 DIAGNOSIS — E11.29 MICROALBUMINURIA DUE TO TYPE 2 DIABETES MELLITUS (MULTI): ICD-10-CM

## 2024-02-21 DIAGNOSIS — E78.2 MIXED HYPERLIPIDEMIA: ICD-10-CM

## 2024-02-21 DIAGNOSIS — I10 HYPERTENSION, UNSPECIFIED TYPE: ICD-10-CM

## 2024-02-21 DIAGNOSIS — Z00.00 VISIT FOR PREVENTIVE HEALTH EXAMINATION: Primary | ICD-10-CM

## 2024-02-21 DIAGNOSIS — Z12.31 ENCOUNTER FOR SCREENING MAMMOGRAM FOR MALIGNANT NEOPLASM OF BREAST: ICD-10-CM

## 2024-02-21 DIAGNOSIS — I10 BENIGN ESSENTIAL HYPERTENSION: ICD-10-CM

## 2024-02-21 DIAGNOSIS — R80.9 MICROALBUMINURIA DUE TO TYPE 2 DIABETES MELLITUS (MULTI): ICD-10-CM

## 2024-02-21 DIAGNOSIS — I73.9 PAD (PERIPHERAL ARTERY DISEASE) (CMS-HCC): ICD-10-CM

## 2024-02-21 DIAGNOSIS — I65.23 ASYMPTOMATIC BILATERAL CAROTID ARTERY STENOSIS: ICD-10-CM

## 2024-02-21 LAB — POC HEMOGLOBIN A1C: 8.4 % (ref 4.2–6.5)

## 2024-02-21 PROCEDURE — 4010F ACE/ARB THERAPY RXD/TAKEN: CPT | Performed by: FAMILY MEDICINE

## 2024-02-21 PROCEDURE — 1036F TOBACCO NON-USER: CPT | Performed by: FAMILY MEDICINE

## 2024-02-21 PROCEDURE — 3077F SYST BP >= 140 MM HG: CPT | Performed by: FAMILY MEDICINE

## 2024-02-21 PROCEDURE — 99397 PER PM REEVAL EST PAT 65+ YR: CPT | Performed by: FAMILY MEDICINE

## 2024-02-21 PROCEDURE — 1160F RVW MEDS BY RX/DR IN RCRD: CPT | Performed by: FAMILY MEDICINE

## 2024-02-21 PROCEDURE — 99214 OFFICE O/P EST MOD 30 MIN: CPT | Performed by: FAMILY MEDICINE

## 2024-02-21 PROCEDURE — 83036 HEMOGLOBIN GLYCOSYLATED A1C: CPT | Performed by: FAMILY MEDICINE

## 2024-02-21 PROCEDURE — 3078F DIAST BP <80 MM HG: CPT | Performed by: FAMILY MEDICINE

## 2024-02-21 PROCEDURE — 1159F MED LIST DOCD IN RCRD: CPT | Performed by: FAMILY MEDICINE

## 2024-02-21 RX ORDER — ERGOCALCIFEROL 1.25 MG/1
1 CAPSULE ORAL
COMMUNITY
Start: 2023-12-28

## 2024-02-21 RX ORDER — EMPAGLIFLOZIN AND METFORMIN HYDROCHLORIDE 5; 1000 MG/1; MG/1
1 TABLET ORAL 2 TIMES DAILY
Qty: 180 TABLET | Refills: 2 | Status: CANCELLED | OUTPATIENT
Start: 2024-02-21

## 2024-02-21 RX ORDER — AMLODIPINE BESYLATE 10 MG/1
10 TABLET ORAL DAILY
Qty: 90 TABLET | Refills: 2 | Status: SHIPPED | OUTPATIENT
Start: 2024-02-21 | End: 2025-02-20

## 2024-02-21 RX ORDER — CLOPIDOGREL BISULFATE 75 MG/1
75 TABLET ORAL DAILY
Qty: 90 TABLET | Refills: 2 | Status: SHIPPED | OUTPATIENT
Start: 2024-02-21

## 2024-02-21 RX ORDER — LISINOPRIL 40 MG/1
40 TABLET ORAL
Qty: 90 TABLET | Refills: 2 | Status: SHIPPED | OUTPATIENT
Start: 2024-02-21 | End: 2025-02-20

## 2024-02-21 RX ORDER — EMPAGLIFLOZIN AND METFORMIN HYDROCHLORIDE 5; 1000 MG/1; MG/1
1 TABLET ORAL 2 TIMES DAILY
Qty: 180 TABLET | Refills: 1
Start: 2024-02-21

## 2024-02-21 RX ORDER — GLIPIZIDE 10 MG/1
10 TABLET ORAL
Qty: 180 TABLET | Refills: 2 | Status: SHIPPED | OUTPATIENT
Start: 2024-02-21 | End: 2025-02-20

## 2024-02-21 RX ORDER — ROSUVASTATIN CALCIUM 40 MG/1
40 TABLET, COATED ORAL DAILY
Qty: 90 TABLET | Refills: 2 | Status: SHIPPED | OUTPATIENT
Start: 2024-02-21 | End: 2025-02-20

## 2024-02-21 NOTE — PROGRESS NOTES
Subjective   Patient ID: Merissa English is a 64 y.o. female who presents for Annual Exam.    Assessment/Plan     Problem List Items Addressed This Visit       Hyperlipidemia    Relevant Medications    rosuvastatin (Crestor) 40 mg tablet    Benign essential hypertension    Chronic obstructive lung disease (CMS/HCC)    Hypertension    Relevant Medications    amLODIPine (Norvasc) 10 mg tablet    lisinopril 40 mg tablet    Other Relevant Orders    TSH with reflex to Free T4 if abnormal    Lipid Panel    Comprehensive Metabolic Panel    CBC    Urinalysis with Reflex Microscopic    Albumin , Urine Random    Microalbuminuria due to type 2 diabetes mellitus (CMS/HCC)    Relevant Medications    glipiZIDE (Glucotrol) 10 mg tablet    empagliflozin-metformin (Synjardy) 5-1,000 mg    Other Relevant Orders    POCT glycosylated hemoglobin (Hb A1C) manually resulted    TSH with reflex to Free T4 if abnormal    Lipid Panel    Comprehensive Metabolic Panel    CBC    Urinalysis with Reflex Microscopic    Albumin , Urine Random    PAD (peripheral artery disease) (CMS/HCC)    Relevant Medications    clopidogrel (Plavix) 75 mg tablet    Visit for preventive health examination - Primary    Relevant Orders    TSH with reflex to Free T4 if abnormal    Lipid Panel    Comprehensive Metabolic Panel    CBC    Urinalysis with Reflex Microscopic    Albumin , Urine Random     Other Visit Diagnoses       Encounter for screening mammogram for malignant neoplasm of breast        Relevant Orders    BI mammo bilateral screening tomosynthesis (Completed)          Prevnar 20 previous visit  Cologuard was negative 4/23  CT chest done in April 2023 reviewed stable finding of centrilobular emphysema    Next visit  Consider carotid Doppler      Mammogram in June 2021 within normal limits-ordered today  Discussed about shingles and RSV  GI information provided 4 EGD colonoscopy patient did not go       Discussed about healthy lifestyle diet exercise  Continue  current medication  Advised to follow-up with OB/GYN for Pap smear  Advised to follow-up with ophthalmology  I'll up in 3 months  Received flu vaccine     Schedule follow-up with OB/GYN for Pap smear    HPI    64-year-old female here for physical and follow-up on     MARY reviewed showed  Right 0.76  Left 0.62    Discussed in detail patient is asymptomatic - med mx - no ref for now  Hemoglobin A1c 8.0-->7.6-->8.4  Again discussed about diet exercise  Trulicity - back order - pt will let us know - will consider ozempic     again discussed about lifestyle modification we will continue current treatment follow-up in 3 months if it is not well controlled increase Trulicity  Patient agrees    Reminded her to see opthal     NSTEMi.  on aspirin, statin, beta blocker, lisinopril , plavix.   Echo showed ejection fraction 30-35 percent with lateral and apical wall akinesis in 2/18 - recent echo showed ef 50-55% - Lasix has been discontinued by patient - no edema no shortness of breath at this point     Cardiac catheter revealed status post mild LAD SAMUEL - on 2/27/2018,  hypertension. Continue carvedilol and lisinopril.  3. Hyperlipidemia. Continue Crestor 40 mg with fenofibrate.  4. Diabetes. cont metformin. Continue glipizide and synjardy and Trulicity  5. Generalized anxiety disorder, was on p.r.n. Xanax.      History of smoking more than 30 pack per year stopped in 2017     No hypoglycemia     Chronic constipation  Possible COPD emphysema  Anxiety depression  Cataract  Hearing loss  Wearing dentures       No Known Allergies    Current Outpatient Medications   Medication Sig Dispense Refill    Accu-Chek Fastclix Lancet Drum misc 2 times a day.      Accu-Chek Guide test strips strip USE 1 STRIP TWICE DAILY 100 strip 3    aspirin 81 mg EC tablet Take 1 tablet (81 mg) by mouth once daily.      blood sugar diagnostic (Accu-Chek Nina Plus test strp) strip once daily.      carvedilol (Coreg) 12.5 mg tablet Take 1 tablet (12.5 mg)  "by mouth 2 times a day with meals. 180 tablet 2    dulaglutide (Trulicity) 1.5 mg/0.5 mL pen injector injection Inject 1.5 mg under the skin 1 (one) time per week. 12 each 2    ergocalciferol (Vitamin D-2) 1.25 MG (72971 UT) capsule Take 1 capsule (1,250 mcg) by mouth 1 (one) time per week.      fenofibrate (Tricor) 145 mg tablet TAKE ONE TABLET BY MOUTH DAILY 90 tablet 2    iron polysaccharides (Nu-Iron,Niferex) 150 mg iron capsule TAKE ONE CAPSULE BY MOUTH TWO TIMES A  capsule 1    nitroglycerin (Nitrostat) 0.4 mg SL tablet Place under the tongue.      amLODIPine (Norvasc) 10 mg tablet Take 1 tablet (10 mg) by mouth once daily. 90 tablet 2    clopidogrel (Plavix) 75 mg tablet Take 1 tablet (75 mg) by mouth once daily. 90 tablet 2    empagliflozin-metformin (Synjardy) 5-1,000 mg Take 1 tablet by mouth 2 times a day. Need to increase the dose on next visit 180 tablet 1    glipiZIDE (Glucotrol) 10 mg tablet Take 1 tablet (10 mg) by mouth 2 times a day before meals. 180 tablet 2    lisinopril 40 mg tablet Take 1 tablet (40 mg) by mouth once daily. 90 tablet 2    rosuvastatin (Crestor) 40 mg tablet Take 1 tablet (40 mg) by mouth once daily. 90 tablet 2     No current facility-administered medications for this visit.       Objective   Visit Vitals  /70 (BP Location: Left arm, Patient Position: Sitting)   Pulse 71   Temp 35.9 °C (96.7 °F)   Ht 1.549 m (5' 1\")   Wt 56.7 kg (125 lb)   SpO2 96%   BMI 23.62 kg/m²   Smoking Status Former   BSA 1.56 m²     Physical Exam  Cardiovascular:      Pulses:           Dorsalis pedis pulses are 0 on the right side and 0 on the left side.        Posterior tibial pulses are 0 on the right side and 0 on the left side.   Musculoskeletal:      Right foot: Normal range of motion. No deformity, bunion or foot drop.      Left foot: Normal range of motion. No deformity, bunion or foot drop.   Feet:      Right foot:      Protective Sensation: 5 sites tested.  5 sites sensed.      " Skin integrity: Skin integrity normal.      Toenail Condition: Right toenails are normal.      Left foot:      Protective Sensation: 5 sites tested.  5 sites sensed.      Skin integrity: Skin integrity normal.      Toenail Condition: Left toenails are normal.       Constitutional   General appearance: Alert and in no acute distress.   Head and Face   Head and face: Normal.     Palpation of the face and sinuses: Normal.    Eyes   Inspection of eyes: Sclera and conjunctiva were normal.    Pupil exam: Pupils were equal in size. Extraocular movements were intact.   Ears, Nose, Mouth, and Throat   Ears: Auricles: Normal.    Otoscopic examination: Tympanic membranes: Normal with no congestion and no discharge. Otic Canals: Normal without tenderness, congestion or discharge.    Hearing: Normal.     Nasal mucosa, septum, and turbinates: Normal without edema or erythema.    Lips, teeth, and gums: Normal.    Oropharynx: Normal with moist mucus membranes, no congestion. Tonsils: Normal no follicles.   Neck   Neck Exam: Appearance of the neck was normal. No neck masses observed.    Thyroid exam: Not enlarged and no palpable thyroid nodules.   Pulmonary   Respiratory assessment: No respiratory distress, normal respiratory rhythm and effort.    Auscultation of Lungs: Clear bilateral breath sounds.   Cardiovascular   Auscultation of heart: Apical pulse normal, heart rate and rhythm normal, normal S1 and S2, no murmurs and no pericardial rub.    Carotid arteries: Pulses normal with no bruits.    Exam for edema: No peripheral edema.   Chest   Chest: Normal A_P diameter, no pulsation, no intercostal withdrawing. Trachea central.   Abdomen   Abdominal Exam: No bruits, normal bowel sounds, soft, non-tender, no abdominal mass palpated.    Liver and Spleen exam: No hepato-splenomegaly.    Examination for hernias: Normal.      Lymphatic   Palpation of lymph nodes in neck: No cervical lymphadenopathy.   Musculoskeletal   Examination of  gait: Normal.    Inspection of digits and nails: No clubbing or cyanosis of the fingernails.    Inspection/palpation of joints, bones and muscles: No joint swelling. Normal movement of all extremities.    Range of Motion: Normal movement of all extremities.   Skin   Skin inspection: Normal skin color and pigmentation, normal skin turgor and no visible rash.   Neurologic   Cranial nerves: Nerves 2-12 were intact, no focal neuro defects.    Reflexes: Normal.     Sensation: Normal.     Coordination: Normal.    Psychiatric   Judgment and insight: Intact.    Orientation: Oriented to person, place, and time.    Recent and remote memory: Normal.     Mood and affect: Normal.     Genitourinary -follow-up with OB/GYN   Immunization History   Administered Date(s) Administered    Moderna COVID-19 vaccine, bivalent, blue cap/gray label *Check age/dose* 09/27/2022    Moderna SARS-CoV-2 Vaccination 03/28/2021, 04/26/2021, 12/15/2021       Review of Systems    No visits with results within 4 Month(s) from this visit.   Latest known visit with results is:   Office Visit on 08/02/2023   Component Date Value Ref Range Status    POC HEMOGLOBIN A1c 08/02/2023 7.6 (A)  4.2 - 6.5 % Final       Radiology: Reviewed imaging in powerchart.  CT lung screening low dose    Result Date: 4/3/2023  CT  OF THE CHEST  CLINICAL INDICATION:  Former smoker. Screening. FINDINGS:  Serial axial images from the thoracic inlet to the upper abdomen were obtained without administration of contrast with low-dose screen technique.   Comparison is made to prior CT obtained on 7/6/2022. Coronal and sagittal reformatted images were generated from the axial data set. This exam was performed according to our departmental dose optimization program and includes the following measures when applicable: Automated exposure control, adjustment of the MAS and or KVP according to the patient size and are exam and an iterative reconstruction algorithm. The heart and  mediastinal vascular structures are  again remarkable for the presence of calcified plaques in the coronary arteries and thoracic aorta..  There are no axillary, hilar or mediastinal abnormally enlarged lymph nodes.  There is no evidence of mediastinal masses. Again the lungs are hyperexpanded and hyperlucent with flattening of the diaphragms and increased retrosternal space. . Small focal area of subsegmental atelectasis in the posterior right lung base is not identified.  There is no evidence of  pleural effusion.  The pulmonary vasculature is normal.  There are no intrapulmonary nodules or masses.  The airways are normal. However, the right lower lobe posterior basal segmental bronchi is completely filled and occluded most likely representing a mucous plug. This finding was not present on previous CT. Within the limitations of the exam the visualize segment of the upper abdomen is unremarkable. No evidence of osteoblastic or osteolytic lesions is identified in bones of the chest. Again mild osteoarthritic changes of the thoracic spine are noted. IMPRESSION: Stable findings of centrilobular emphysema. Occlusion of the posterior basal segmental bronchus of the right lower lobe most likely representing mucous plug. There is secondary small focal area of subsegmental atelectasis at the lung base. Clinical correlation is suggested to determine the significance of this finding. Stable calcified atherosclerosis of the coronary arteries and thoracic aorta. Nodules seen on previous CT in the right lower lobe, is no longer seen. Lung RADS category 1. Recommendations: Continue yearly screening with LD CT Electronically signed by:  Taylor Garay MD  4/5/2023 9:38 AM CDT Workstation: [id number] Technologist:  EMIGDIO DIETZ Dictated By:   TAYLOR GARAY MD Signed By:     TAYLOR GARAY MD Signed Out:    04/05/23 10:38:56      No family history on file.  Social History     Socioeconomic History    Marital status:      Spouse  name: None    Number of children: None    Years of education: None    Highest education level: None   Occupational History    None   Tobacco Use    Smoking status: Former     Packs/day: 1.50     Years: 15.00     Additional pack years: 0.00     Total pack years: 22.50     Types: Cigarettes    Smokeless tobacco: Never   Substance and Sexual Activity    Alcohol use: Never    Drug use: Never    Sexual activity: None   Other Topics Concern    None   Social History Narrative    None     Social Determinants of Health     Financial Resource Strain: Not on file   Food Insecurity: Not on file   Transportation Needs: Not on file   Physical Activity: Not on file   Stress: Not on file   Social Connections: Not on file   Intimate Partner Violence: Not on file   Housing Stability: Not on file     Past Medical History:   Diagnosis Date    Unspecified systolic (congestive) heart failure (CMS/HCC) 2018    Systolic CHF     Past Surgical History:   Procedure Laterality Date    CATARACT EXTRACTION  2016    Cataract Surgery     SECTION, CLASSIC  10/29/2014     Section    CORONARY ANGIOPLASTY WITH STENT PLACEMENT  2018    Cath Placement Of Stent 1    LYMPH NODE BIOPSY  2016    Biopsy Lymph Node       Charting was completed using voice recognition technology and may include unintended errors.

## 2024-03-28 ENCOUNTER — TELEPHONE (OUTPATIENT)
Dept: PRIMARY CARE | Facility: CLINIC | Age: 64
End: 2024-03-28
Payer: COMMERCIAL

## 2024-03-28 NOTE — TELEPHONE ENCOUNTER
Pt called and she needs to get a kidney biopsy but needs permission from  to stop taking clopidogrel for several days before test

## 2024-03-29 NOTE — TELEPHONE ENCOUNTER
Talked to pt and she is going to call the surgeons office to make sure they send something if they haven't

## 2024-04-04 LAB
NON-UH HIE A/G RATIO: 1
NON-UH HIE ALB: 3.1 G/DL (ref 3.4–5)
NON-UH HIE ALK PHOS: 39 UNIT/L (ref 45–117)
NON-UH HIE APPEARANCE, U: CLEAR
NON-UH HIE BILIRUBIN, TOTAL: 0.2 MG/DL (ref 0.3–1.2)
NON-UH HIE BILIRUBIN, U: NEGATIVE
NON-UH HIE BLOOD, U: NEGATIVE
NON-UH HIE BUN/CREAT RATIO: 31.7
NON-UH HIE BUN: 19 MG/DL (ref 9–23)
NON-UH HIE CALCIUM: 9.3 MG/DL (ref 8.7–10.4)
NON-UH HIE CALCULATED LDL CHOLESTEROL: 59 MG/DL (ref 60–130)
NON-UH HIE CALCULATED OSMOLALITY: 286 MOSM/KG (ref 275–295)
NON-UH HIE CHLORIDE: 111 MMOL/L (ref 98–107)
NON-UH HIE CHOLESTEROL: 122 MG/DL (ref 100–200)
NON-UH HIE CO2, VENOUS: 26 MMOL/L (ref 20–31)
NON-UH HIE COLOR, U: YELLOW
NON-UH HIE CREATININE, URINE MG/DL: 50.4 MG/DL
NON-UH HIE CREATININE: 0.6 MG/DL (ref 0.5–0.8)
NON-UH HIE GFR AA: >60
NON-UH HIE GLOBULIN: 3 G/DL
NON-UH HIE GLOMERULAR FILTRATION RATE: >60 ML/MIN/1.73M?
NON-UH HIE GLUCOSE QUAL, U: ABNORMAL
NON-UH HIE GLUCOSE: 113 MG/DL (ref 74–106)
NON-UH HIE GOT: 13 UNIT/L (ref 15–37)
NON-UH HIE GPT: 12 UNIT/L (ref 10–49)
NON-UH HIE HCT: 34.6 % (ref 36–46)
NON-UH HIE HDL CHOLESTEROL: 34 MG/DL (ref 40–60)
NON-UH HIE HGB: 11.7 G/DL (ref 12–16)
NON-UH HIE INSTR WBC ND: 7.2
NON-UH HIE K: 4.3 MMOL/L (ref 3.5–5.1)
NON-UH HIE KETONES, U: ABNORMAL
NON-UH HIE LEUKOCYTE ESTERASE, U: NEGATIVE
NON-UH HIE MCH: 30.3 PG (ref 27–34)
NON-UH HIE MCHC: 33.7 G/DL (ref 32–37)
NON-UH HIE MCV: 90 FL (ref 80–100)
NON-UH HIE MICROALBUMIN, URINE MG/L: 1361 MG/L
NON-UH HIE MICROALBUMIN/CREATININE RATIO: 2700 MG MALB/GM CREAT (ref 0–30)
NON-UH HIE MPV: 8 FL (ref 7.4–10.4)
NON-UH HIE NA: 142 MMOL/L (ref 135–145)
NON-UH HIE NITRITE, U: NEGATIVE
NON-UH HIE PH, U: 6 (ref 4.5–8)
NON-UH HIE PLATELET: 472 X10 (ref 150–450)
NON-UH HIE PROTEIN, U: ABNORMAL
NON-UH HIE RBC: 3.84 X10 (ref 4.2–5.4)
NON-UH HIE RDW: 15.1 % (ref 11.5–14.5)
NON-UH HIE SPECIFIC GRAVITY, U: 1.02 (ref 1–1.03)
NON-UH HIE SQUAMOUS EPITHELIAL CELLS, U: <1 #/HPF
NON-UH HIE TOTAL CHOL/HDL CHOL RATIO: 3.6
NON-UH HIE TOTAL PROTEIN: 6.1 G/DL (ref 5.7–8.2)
NON-UH HIE TRIGLYCERIDES: 143 MG/DL (ref 30–150)
NON-UH HIE TSH: 1.44 UIU/ML (ref 0.55–4.78)
NON-UH HIE U MICRO: ABNORMAL
NON-UH HIE UROBILINOGEN QUAL, U: ABNORMAL
NON-UH HIE WBC/HPF, U: 1 #/HPF (ref 0–5)
NON-UH HIE WBC: 7.2 X10 (ref 4.5–11)

## 2024-04-09 ENCOUNTER — TELEPHONE (OUTPATIENT)
Dept: PRIMARY CARE | Facility: CLINIC | Age: 64
End: 2024-04-09
Payer: COMMERCIAL

## 2024-04-09 NOTE — TELEPHONE ENCOUNTER
----- Message from Nuno Pearson MD sent at 4/9/2024  8:50 AM EDT -----  Patient needs left diagnostic mammogram with ML and CC views

## 2024-04-15 ENCOUNTER — TELEPHONE (OUTPATIENT)
Dept: PRIMARY CARE | Facility: CLINIC | Age: 64
End: 2024-04-15
Payer: COMMERCIAL

## 2024-04-15 PROBLEM — R92.1 BREAST CALCIFICATION, LEFT: Status: ACTIVE | Noted: 2024-04-15

## 2024-05-02 ENCOUNTER — TELEPHONE (OUTPATIENT)
Dept: PRIMARY CARE | Facility: CLINIC | Age: 64
End: 2024-05-02
Payer: COMMERCIAL

## 2024-05-02 NOTE — TELEPHONE ENCOUNTER
----- Message from Nuno Pearson MD sent at 5/2/2024  2:20 PM EDT -----  Please call the patient regarding her abnormal result.  Patient needs to go for left breast biopsy

## 2024-05-03 NOTE — TELEPHONE ENCOUNTER
spoke w/pt & let her know we sent the order to the surgeons office about holding the plaxix 5 days before the surgery& she also wanted to know about her lisinopril  she said dr upton told her to stop the 40mg, she saw dr senior & he rxed 20mg qhs inaddition, please clarify, i advised to do what dr senior advised since her bp was high in the office & that i would let her know next week what dr lopez says

## 2024-05-16 DIAGNOSIS — I10 HYPERTENSION, UNSPECIFIED TYPE: ICD-10-CM

## 2024-05-16 RX ORDER — CARVEDILOL 12.5 MG/1
12.5 TABLET ORAL
Qty: 180 TABLET | Refills: 2 | Status: SHIPPED | OUTPATIENT
Start: 2024-05-16

## 2024-05-18 DIAGNOSIS — R80.9 MICROALBUMINURIA DUE TO TYPE 2 DIABETES MELLITUS (MULTI): ICD-10-CM

## 2024-05-18 DIAGNOSIS — E11.29 MICROALBUMINURIA DUE TO TYPE 2 DIABETES MELLITUS (MULTI): ICD-10-CM

## 2024-05-21 RX ORDER — DULAGLUTIDE 1.5 MG/.5ML
1.5 INJECTION, SOLUTION SUBCUTANEOUS
Qty: 6 ML | Refills: 2 | Status: SHIPPED | OUTPATIENT
Start: 2024-05-26

## 2024-06-06 NOTE — TELEPHONE ENCOUNTER
I made PT an appointment for 2/21/24 at 2:30 PM.  This was the first available.  Please send in enough med to get her to appointment.    
The patient has been re-examined and I agree with the above assessment or I updated with my findings.

## 2024-06-14 DIAGNOSIS — E78.5 HYPERLIPIDEMIA, UNSPECIFIED HYPERLIPIDEMIA TYPE: ICD-10-CM

## 2024-06-14 RX ORDER — FENOFIBRATE 145 MG/1
TABLET, FILM COATED ORAL
Qty: 90 TABLET | Refills: 2 | Status: SHIPPED | OUTPATIENT
Start: 2024-06-14

## 2024-07-25 DIAGNOSIS — D50.9 IRON DEFICIENCY ANEMIA, UNSPECIFIED IRON DEFICIENCY ANEMIA TYPE: ICD-10-CM

## 2024-07-25 RX ORDER — IRON POLYSACCHARIDE COMPLEX 150 MG
150 CAPSULE ORAL 2 TIMES DAILY
Qty: 180 CAPSULE | Refills: 1 | Status: SHIPPED | OUTPATIENT
Start: 2024-07-25

## 2024-09-16 ENCOUNTER — TELEPHONE (OUTPATIENT)
Dept: PRIMARY CARE | Facility: CLINIC | Age: 64
End: 2024-09-16
Payer: COMMERCIAL

## 2024-09-16 DIAGNOSIS — E11.29 MICROALBUMINURIA DUE TO TYPE 2 DIABETES MELLITUS (MULTI): ICD-10-CM

## 2024-09-16 DIAGNOSIS — R80.9 MICROALBUMINURIA DUE TO TYPE 2 DIABETES MELLITUS (MULTI): ICD-10-CM

## 2024-09-17 RX ORDER — DULAGLUTIDE 1.5 MG/.5ML
1.5 INJECTION, SOLUTION SUBCUTANEOUS
Qty: 6 ML | Refills: 0 | Status: SHIPPED | OUTPATIENT
Start: 2024-09-17

## 2024-09-17 RX ORDER — EMPAGLIFLOZIN AND METFORMIN HYDROCHLORIDE 5; 1000 MG/1; MG/1
1 TABLET ORAL 2 TIMES DAILY
Qty: 180 TABLET | Refills: 0 | Status: SHIPPED | OUTPATIENT
Start: 2024-09-17

## 2024-10-07 ENCOUNTER — APPOINTMENT (OUTPATIENT)
Dept: PRIMARY CARE | Facility: CLINIC | Age: 64
End: 2024-10-07
Payer: COMMERCIAL

## 2024-10-07 VITALS
OXYGEN SATURATION: 97 % | WEIGHT: 123 LBS | SYSTOLIC BLOOD PRESSURE: 147 MMHG | HEIGHT: 61 IN | HEART RATE: 73 BPM | BODY MASS INDEX: 23.22 KG/M2 | TEMPERATURE: 97.4 F | DIASTOLIC BLOOD PRESSURE: 60 MMHG

## 2024-10-07 DIAGNOSIS — Z87.891 HISTORY OF SMOKING 10-25 PACK YEARS: Primary | ICD-10-CM

## 2024-10-07 PROCEDURE — G2211 COMPLEX E/M VISIT ADD ON: HCPCS | Performed by: FAMILY MEDICINE

## 2024-10-07 PROCEDURE — 1036F TOBACCO NON-USER: CPT | Performed by: FAMILY MEDICINE

## 2024-10-07 PROCEDURE — 99214 OFFICE O/P EST MOD 30 MIN: CPT | Performed by: FAMILY MEDICINE

## 2024-10-07 PROCEDURE — 3008F BODY MASS INDEX DOCD: CPT | Performed by: FAMILY MEDICINE

## 2024-10-07 PROCEDURE — 1160F RVW MEDS BY RX/DR IN RCRD: CPT | Performed by: FAMILY MEDICINE

## 2024-10-07 PROCEDURE — 1159F MED LIST DOCD IN RCRD: CPT | Performed by: FAMILY MEDICINE

## 2024-10-07 PROCEDURE — 3078F DIAST BP <80 MM HG: CPT | Performed by: FAMILY MEDICINE

## 2024-10-07 PROCEDURE — 4010F ACE/ARB THERAPY RXD/TAKEN: CPT | Performed by: FAMILY MEDICINE

## 2024-10-07 PROCEDURE — 3077F SYST BP >= 140 MM HG: CPT | Performed by: FAMILY MEDICINE

## 2024-10-07 RX ORDER — FINERENONE 10 MG/1
1 TABLET, FILM COATED ORAL
COMMUNITY
Start: 2024-09-26

## 2024-10-07 RX ORDER — LISINOPRIL 20 MG/1
20 TABLET ORAL NIGHTLY
COMMUNITY
Start: 2024-09-30

## 2024-10-07 RX ORDER — SENNOSIDES 8.6 MG
71.5 TABLET ORAL 2 TIMES DAILY
COMMUNITY
Start: 2024-09-10 | End: 2025-03-09

## 2024-10-07 NOTE — PROGRESS NOTES
Subjective   Patient ID: Merissa English is a 64 y.o. female who presents for Follow-up.    Assessment/Plan     Problem List Items Addressed This Visit    None      Prevnar 20 previous visit  Cologuard was negative 4/23  CT chest done in April 2023 reviewed stable finding of centrilobular emphysema-- req provided today  Follow-up in 6 months consider wellness at that time      Consider carotid Doppler- done in 2024 mild disease bilaterally      Mammogram in June 2021 within normal limits-ordered today  Discussed about shingles and RSV  GI information provided 4 EGD colonoscopy patient did not go       Discussed about healthy lifestyle diet exercise  Continue current medication  Advised to follow-up with OB/GYN for Pap smear  Advised to follow-up with ophthalmology    Received flu vaccine     Schedule follow-up with OB/GYN for Pap smear    HPI    64-year-old female here for follow-up on     Who recently had a hemoglobin A1c done 3 days ago patient will send us the result    MARY reviewed showed  Right 0.76  Left 0.62    Discussed in detail patient is asymptomatic - med mx - no ref for now  Hemoglobin A1c 8.0-->7.6-->8.4  Again discussed about diet exercise  Trulicity - back order - pt will let us know - will consider ozempic  Magnesium was added   again discussed about lifestyle modification we will continue current treatment follow-up in 3 months if it is not well controlled increase Trulicity  Patient agrees    Reminded her to see opthal     NSTEMi.  on aspirin, statin, beta blocker, lisinopril , plavix.   Echo showed ejection fraction 30-35 percent with lateral and apical wall akinesis in 2/18 - recent echo showed ef 50-55% - Lasix has been discontinued by patient - no edema no shortness of breath at this point     Cardiac catheter revealed status post mild LAD SAMUEL - on 2/27/2018,  hypertension. Continue carvedilol and lisinopril.  3. Hyperlipidemia. Continue Crestor 40 mg with fenofibrate.  4. Diabetes. cont  metformin. Continue glipizide and synjardy and Trulicity  5. Generalized anxiety disorder, was on p.r.n. Xanax.      History of smoking more than 30 pack per year stopped in 2017     No hypoglycemia     Chronic constipation  Possible COPD emphysema  Anxiety depression  Cataract  Hearing loss  Wearing dentures       No Known Allergies    Current Outpatient Medications   Medication Sig Dispense Refill    amLODIPine (Norvasc) 10 mg tablet Take 1 tablet (10 mg) by mouth once daily. 90 tablet 2    aspirin 81 mg EC tablet Take 1 tablet (81 mg) by mouth once daily.      carvedilol (Coreg) 12.5 mg tablet Take 1 tablet (12.5 mg) by mouth 2 times daily (morning and late afternoon). 180 tablet 2    clopidogrel (Plavix) 75 mg tablet Take 1 tablet (75 mg) by mouth once daily. 90 tablet 2    dulaglutide (Trulicity) 1.5 mg/0.5 mL pen injector injection Inject 1.5 mg under the skin 1 (one) time per week. 6 mL 0    empagliflozin-metformin (Synjardy) 5-1,000 mg Take 1 tablet by mouth 2 times a day. Need to increase the dose on next visit 180 tablet 0    ergocalciferol (Vitamin D-2) 1.25 MG (94755 UT) capsule Take 1 capsule (1,250 mcg) by mouth 1 (one) time per week.      fenofibrate (Tricor) 145 mg tablet TAKE ONE TABLET BY MOUTH DAILY 90 tablet 2    glipiZIDE (Glucotrol) 10 mg tablet Take 1 tablet (10 mg) by mouth 2 times a day before meals. 180 tablet 2    iron polysaccharides (Nu-Iron,Niferex) 150 mg iron capsule Take 1 capsule (150 mg) by mouth 2 times a day. 180 capsule 1    Kerendia 10 mg tablet tablet Take 1 tablet (10 mg) by mouth early in the morning..      lisinopril 20 mg tablet Take 1 tablet (20 mg) by mouth once daily at bedtime.      lisinopril 40 mg tablet Take 1 tablet (40 mg) by mouth once daily. 90 tablet 2    nitroglycerin (Nitrostat) 0.4 mg SL tablet Place under the tongue.      rosuvastatin (Crestor) 40 mg tablet Take 1 tablet (40 mg) by mouth once daily. 90 tablet 2    Slow-Mag 71.5 mg tablet,delayed release  "(DR/EC) Take 1 tablet (71.5 mg) by mouth twice a day.      Accu-Chek Fastclix Lancet Drum misc 2 times a day.      Accu-Chek Guide test strips strip USE 1 STRIP TWICE DAILY 100 strip 3    blood sugar diagnostic (Accu-Chek Nina Plus test strp) strip once daily.       No current facility-administered medications for this visit.       Objective   Visit Vitals  /60 (BP Location: Left arm, Patient Position: Sitting)   Pulse 73   Temp 36.3 °C (97.4 °F)   Ht 1.549 m (5' 1\")   Wt 55.8 kg (123 lb)   SpO2 97%   BMI 23.24 kg/m²   Smoking Status Former   BSA 1.55 m²     Physical Exam  Cardiovascular:      Pulses:           Dorsalis pedis pulses are 0 on the right side and 0 on the left side.        Posterior tibial pulses are 0 on the right side and 0 on the left side.   Musculoskeletal:      Right foot: Normal range of motion. No deformity, bunion or foot drop.      Left foot: Normal range of motion. No deformity, bunion or foot drop.   Feet:      Right foot:      Protective Sensation: 5 sites tested.  5 sites sensed.      Skin integrity: Skin integrity normal.      Toenail Condition: Right toenails are normal.      Left foot:      Protective Sensation: 5 sites tested.  5 sites sensed.      Skin integrity: Skin integrity normal.      Toenail Condition: Left toenails are normal.       Constitutional:       General: He is not in acute distress.     Appearance: Normal appearance.   HENT:      Head: Normocephalic and atraumatic.      Nose: Nose normal.   Eyes:      Extraocular Movements: Extraocular movements intact.      Conjunctiva/sclera: Conjunctivae normal.   Cardiovascular:      Rate and Rhythm: Normal rate and regular rhythm.   Pulmonary:      Effort: Pulmonary effort is normal.      Breath sounds: Normal breath sounds.   Skin:     General: Skin is warm.   Neurological:      Mental Status: He is alert and oriented to person, place, and time.   Psychiatric:         Mood and Affect: Mood normal.         Behavior: " Behavior normal.     2/6 sys mur mur radiating to left > right carotid  Immunization History   Administered Date(s) Administered    Flu vaccine (IIV4), preservative free *Check age/dose* 11/16/2021    Flu vaccine, quadrivalent, no egg protein, age 6 month or greater (FLUCELVAX) 09/27/2020, 09/23/2022    Influenza, Seasonal, Quadrivalent, Adjuvanted 09/24/2023    Influenza, injectable, quadrivalent 09/05/2017, 11/07/2019    Influenza, seasonal, injectable 09/05/2017    Influenza, trivalent, adjuvanted 09/24/2024    Moderna COVID-19 vaccine, bivalent, blue cap/gray label *Check age/dose* 09/27/2022    Moderna SARS-CoV-2 Vaccination 03/28/2021, 04/26/2021, 12/15/2021    Pneumococcal conjugate vaccine, 20-valent (PREVNAR 20) 12/22/2022    Pneumococcal polysaccharide vaccine, 23-valent, age 2 years and older (PNEUMOVAX 23) 09/05/2017       Review of Systems    No visits with results within 4 Month(s) from this visit.   Latest known visit with results is:   Orders Only on 04/04/2024   Component Date Value Ref Range Status    [facility] Squamous Epithelial Audrey* 04/04/2024 <1  #/HPF Final    [facility] Blood, U 04/04/2024 Negative  Negative Final    [facility] Leukocyte Esterase, U 04/04/2024 Negative  Negative Final    [facility] Ketones, U 04/04/2024 Trace (A)  Negative Final    [facility] Glucose Qual, U 04/04/2024 >=500 mg/dl (A)  Negative Final    [facility] Urobilinogen Qual, U 04/04/2024 <2.0 mg/dl  <2.0 mg/dl Final    [facility] Color, U 04/04/2024 Yellow   Final    [facility] pH, U 04/04/2024 6.0  4.5 - 8.0 Final    [facility] WBC/HPF, U 04/04/2024 1  0 - 5 #/HPF Final    [facility] Specific Gravity, U 04/04/2024 1.022  1.001 - 1.035 Final    [facility] Nitrite, U 04/04/2024 Negative  Negative Final    [facility] Bilirubin, U 04/04/2024 Negative  Negative Final    [facility] Appearance, U 04/04/2024 Clear   Final    [facility] Protein, U 04/04/2024 100 mg/dl (A)  Negative Final    [facility] U MICRO 04/04/2024  Indicated   Final    [facility] HGB 04/04/2024 11.7 (L)  12.0 - 16.0 g/dL Final    [facility] MPV 04/04/2024 8.0  7.4 - 10.4 fL Final    [facility] RDW 04/04/2024 15.1 (H)  11.5 - 14.5 % Final    [facility] WBC 04/04/2024 7.2  4.5 - 11.0 x10 Final    [facility] MCH 04/04/2024 30.3  27.0 - 34.0 pg Final    [facility] HCT 04/04/2024 34.6 (L)  36.0 - 46.0 % Final    [facility] Platelet 04/04/2024 472 (H)  150 - 450 x10 Final    [facility] RBC 04/04/2024 3.84 (L)  4.20 - 5.40 x10 Final    [facility] Instr WBC ND 04/04/2024 7.2   Final    [facility] MCHC 04/04/2024 33.7  32.0 - 37.0 g/dL Final    [facility] MCV 04/04/2024 90.0  80.0 - 100.0 fL Final    [facility] GOT 04/04/2024 13 (L)  15 - 37 unit/L Final    [facility] Globulin 04/04/2024 3.0  g/dL Final    [facility] Alk Phos 04/04/2024 39 (L)  45 - 117 unit/L Final    [facility] Chloride 04/04/2024 111 (H)  98 - 107 mmol/L Final    [facility] Glucose 04/04/2024 113 (H)  74 - 106 mg/dL Final    [facility] Glomerular Filtration R* 04/04/2024 >60  mL/min/1.73m? Final    [facility] Calcium 04/04/2024 9.3  8.7 - 10.4 mg/dL Final    [facility] Na 04/04/2024 142  135 - 145 mmol/L Final    [facility] ALB 04/04/2024 3.1 (L)  3.4 - 5.0 g/dL Final    [facility] Calculated Osmolality 04/04/2024 286  275 - 295 mOsm/kg Final    [facility] Total Protein 04/04/2024 6.1  5.7 - 8.2 g/dL Final    [facility] GPT 04/04/2024 12  10 - 49 unit/L Final    [facility] K 04/04/2024 4.3  3.5 - 5.1 mmol/L Final    [facility] CO2, venous 04/04/2024 26.0  20.0 - 31.0 mmol/L Final    [facility] A/G Ratio 04/04/2024 1.0   Final    [facility] Creatinine 04/04/2024 0.6  0.5 - 0.8 mg/dL Final    [facility] GFR AA 04/04/2024 >60   Final    [facility] BUN 04/04/2024 19  9 - 23 mg/dL Final    [facility] BUN/Creat Ratio 04/04/2024 31.7   Final    [facility] Bilirubin, Total 04/04/2024 0.20 (L)  0.30 - 1.20 mg/dL Final    [facility] Cholesterol 04/04/2024 122  100 - 200 mg/dL Final    [facility] Calculated  LDL Choleste* 04/04/2024 59 (L)  60 - 130 mg/dL Final    [facility] Triglycerides 04/04/2024 143  30 - 150 mg/dL Final    [facility] HDL Cholesterol 04/04/2024 34 (L)  40 - 60 mg/dL Final    [facility] Total Chol/HDL Chol Rat* 04/04/2024 3.6   Final    [facility] TSH 04/04/2024 1.44  0.55 - 4.78 uIU/ml Final    [facility] Microalbumin, Urine mg/L 04/04/2024 1,361.0  mg/L Final    [facility] Microalbumin/Creatinine* 04/04/2024 2,700 (H)  0 - 30 mg MALB/gm CREAT Final    [facility] Creatinine, Urine mg/dl 04/04/2024 50.4  mg/dL Final       Radiology: Reviewed imaging in powerchart.  CT lung screening low dose    Result Date: 4/3/2023  CT  OF THE CHEST  CLINICAL INDICATION:  Former smoker. Screening. FINDINGS:  Serial axial images from the thoracic inlet to the upper abdomen were obtained without administration of contrast with low-dose screen technique.   Comparison is made to prior CT obtained on 7/6/2022. Coronal and sagittal reformatted images were generated from the axial data set. This exam was performed according to our departmental dose optimization program and includes the following measures when applicable: Automated exposure control, adjustment of the MAS and or KVP according to the patient size and are exam and an iterative reconstruction algorithm. The heart and mediastinal vascular structures are  again remarkable for the presence of calcified plaques in the coronary arteries and thoracic aorta..  There are no axillary, hilar or mediastinal abnormally enlarged lymph nodes.  There is no evidence of mediastinal masses. Again the lungs are hyperexpanded and hyperlucent with flattening of the diaphragms and increased retrosternal space. . Small focal area of subsegmental atelectasis in the posterior right lung base is not identified.  There is no evidence of  pleural effusion.  The pulmonary vasculature is normal.  There are no intrapulmonary nodules or masses.  The airways are normal. However, the right  lower lobe posterior basal segmental bronchi is completely filled and occluded most likely representing a mucous plug. This finding was not present on previous CT. Within the limitations of the exam the visualize segment of the upper abdomen is unremarkable. No evidence of osteoblastic or osteolytic lesions is identified in bones of the chest. Again mild osteoarthritic changes of the thoracic spine are noted. IMPRESSION: Stable findings of centrilobular emphysema. Occlusion of the posterior basal segmental bronchus of the right lower lobe most likely representing mucous plug. There is secondary small focal area of subsegmental atelectasis at the lung base. Clinical correlation is suggested to determine the significance of this finding. Stable calcified atherosclerosis of the coronary arteries and thoracic aorta. Nodules seen on previous CT in the right lower lobe, is no longer seen. Lung RADS category 1. Recommendations: Continue yearly screening with LD CT Electronically signed by:  Taylor Garay MD  2023 9:38 AM CDT Workstation: [id number] Technologist:  EMIGDIO DIETZ Dictated By:   TAYLOR GARAY MD Signed By:     TAYLOR GARAY MD Signed Out:    23 10:38:56      No family history on file.  Social History     Socioeconomic History    Marital status:    Tobacco Use    Smoking status: Former     Current packs/day: 1.50     Average packs/day: 1.5 packs/day for 15.0 years (22.5 ttl pk-yrs)     Types: Cigarettes    Smokeless tobacco: Never   Substance and Sexual Activity    Alcohol use: Never    Drug use: Never     Past Medical History:   Diagnosis Date    Unspecified systolic (congestive) heart failure 2018    Systolic CHF     Past Surgical History:   Procedure Laterality Date    CATARACT EXTRACTION  2016    Cataract Surgery     SECTION, CLASSIC  10/29/2014     Section    CORONARY ANGIOPLASTY WITH STENT PLACEMENT  2018    Cath Placement Of Stent 1    LYMPH NODE BIOPSY   08/18/2016    Biopsy Lymph Node       Charting was completed using voice recognition technology and may include unintended errors.

## 2024-12-18 ENCOUNTER — TELEPHONE (OUTPATIENT)
Dept: PRIMARY CARE | Facility: CLINIC | Age: 64
End: 2024-12-18
Payer: COMMERCIAL

## 2024-12-18 DIAGNOSIS — R80.9 MICROALBUMINURIA DUE TO TYPE 2 DIABETES MELLITUS (MULTI): ICD-10-CM

## 2024-12-18 DIAGNOSIS — E11.29 MICROALBUMINURIA DUE TO TYPE 2 DIABETES MELLITUS (MULTI): ICD-10-CM

## 2024-12-18 DIAGNOSIS — E78.5 HYPERLIPIDEMIA, UNSPECIFIED HYPERLIPIDEMIA TYPE: ICD-10-CM

## 2024-12-18 DIAGNOSIS — I73.9 PAD (PERIPHERAL ARTERY DISEASE) (CMS-HCC): ICD-10-CM

## 2024-12-18 DIAGNOSIS — I10 HYPERTENSION, UNSPECIFIED TYPE: ICD-10-CM

## 2024-12-19 RX ORDER — GLIPIZIDE 10 MG/1
10 TABLET ORAL
Qty: 180 TABLET | Refills: 2 | Status: SHIPPED | OUTPATIENT
Start: 2024-12-19 | End: 2025-12-19

## 2024-12-19 RX ORDER — LISINOPRIL 40 MG/1
40 TABLET ORAL
Qty: 90 TABLET | Refills: 2 | Status: SHIPPED | OUTPATIENT
Start: 2024-12-19 | End: 2025-12-19

## 2024-12-19 RX ORDER — EMPAGLIFLOZIN AND METFORMIN HYDROCHLORIDE 5; 1000 MG/1; MG/1
1 TABLET ORAL 2 TIMES DAILY
Qty: 180 TABLET | Refills: 2 | Status: SHIPPED | OUTPATIENT
Start: 2024-12-19

## 2024-12-19 RX ORDER — FENOFIBRATE 145 MG/1
145 TABLET, FILM COATED ORAL DAILY
Qty: 90 TABLET | Refills: 2 | Status: SHIPPED | OUTPATIENT
Start: 2024-12-19

## 2024-12-19 RX ORDER — CLOPIDOGREL BISULFATE 75 MG/1
75 TABLET ORAL DAILY
Qty: 90 TABLET | Refills: 2 | Status: SHIPPED | OUTPATIENT
Start: 2024-12-19

## 2025-01-02 ENCOUNTER — TELEPHONE (OUTPATIENT)
Dept: PRIMARY CARE | Facility: CLINIC | Age: 65
End: 2025-01-02
Payer: COMMERCIAL

## 2025-01-02 DIAGNOSIS — D50.9 IRON DEFICIENCY ANEMIA, UNSPECIFIED IRON DEFICIENCY ANEMIA TYPE: ICD-10-CM

## 2025-01-02 RX ORDER — IRON POLYSACCHARIDE COMPLEX 150 MG
150 CAPSULE ORAL 2 TIMES DAILY
Qty: 180 CAPSULE | Refills: 1 | Status: SHIPPED | OUTPATIENT
Start: 2025-01-02

## 2025-01-02 NOTE — TELEPHONE ENCOUNTER
Pt called needing a refill for Ferrex 150mg BID sent to [facility] in [place]. Don't see this on the list, please advice.

## 2025-02-03 ENCOUNTER — TELEPHONE (OUTPATIENT)
Dept: PRIMARY CARE | Facility: CLINIC | Age: 65
End: 2025-02-03
Payer: COMMERCIAL

## 2025-02-03 DIAGNOSIS — R80.9 MICROALBUMINURIA DUE TO TYPE 2 DIABETES MELLITUS (MULTI): ICD-10-CM

## 2025-02-03 DIAGNOSIS — E11.29 MICROALBUMINURIA DUE TO TYPE 2 DIABETES MELLITUS (MULTI): ICD-10-CM

## 2025-02-03 RX ORDER — DULAGLUTIDE 1.5 MG/.5ML
1.5 INJECTION, SOLUTION SUBCUTANEOUS
Qty: 6 ML | Refills: 2 | Status: SHIPPED | OUTPATIENT
Start: 2025-02-03

## 2025-02-06 DIAGNOSIS — I10 HYPERTENSION, UNSPECIFIED TYPE: ICD-10-CM

## 2025-02-07 RX ORDER — AMLODIPINE BESYLATE 10 MG/1
10 TABLET ORAL DAILY
Qty: 90 TABLET | Refills: 2 | Status: SHIPPED | OUTPATIENT
Start: 2025-02-07 | End: 2026-02-07

## 2025-02-07 RX ORDER — CARVEDILOL 12.5 MG/1
12.5 TABLET ORAL
Qty: 180 TABLET | Refills: 2 | Status: SHIPPED | OUTPATIENT
Start: 2025-02-07

## 2025-02-21 DIAGNOSIS — E78.2 MIXED HYPERLIPIDEMIA: ICD-10-CM

## 2025-02-21 RX ORDER — ROSUVASTATIN CALCIUM 40 MG/1
40 TABLET, COATED ORAL DAILY
Qty: 90 TABLET | Refills: 2 | Status: SHIPPED | OUTPATIENT
Start: 2025-02-21

## 2025-03-22 LAB
NON-UH HIE ALB: 3.2 G/DL (ref 3.4–5)
NON-UH HIE APPEARANCE, U: ABNORMAL
NON-UH HIE BASO COUNT: 0.1 X1000 (ref 0–0.2)
NON-UH HIE BASOS %: 1.3 %
NON-UH HIE BILIRUBIN, U: ABNORMAL
NON-UH HIE BLOOD, U: ABNORMAL
NON-UH HIE BUN/CREAT RATIO: 25.7
NON-UH HIE BUN: 18 MG/DL (ref 9–23)
NON-UH HIE CALCIUM: 9.6 MG/DL (ref 8.7–10.4)
NON-UH HIE CALCULATED OSMOLALITY: 285 MOSM/KG (ref 275–295)
NON-UH HIE CHLORIDE: 108 MMOL/L (ref 98–107)
NON-UH HIE CO2, VENOUS: 19 MMOL/L (ref 20–31)
NON-UH HIE COLOR, U: ABNORMAL
NON-UH HIE CORRECTED CALCIUM: 10.2 MG/DL (ref 8.5–10.5)
NON-UH HIE CREATININE, URINE MG/DL: 35 MG/DL
NON-UH HIE CREATININE: 0.7 MG/DL (ref 0.5–0.8)
NON-UH HIE DIFF?: ABNORMAL
NON-UH HIE EOS COUNT: 1.14 X1000 (ref 0–0.5)
NON-UH HIE EOSIN %: 14.7 %
NON-UH HIE GFR AA: >60
NON-UH HIE GLOMERULAR FILTRATION RATE: >60 ML/MIN/1.73M?
NON-UH HIE GLUCOSE QUAL, U: ABNORMAL
NON-UH HIE GLUCOSE: 155 MG/DL (ref 74–106)
NON-UH HIE HCT: 37.2 % (ref 36–46)
NON-UH HIE HGB: 12.1 G/DL (ref 12–16)
NON-UH HIE I-PTH: 73 PG/ML (ref 18.4–80.1)
NON-UH HIE INSTR WBC: 7.8
NON-UH HIE K: 5.4 MMOL/L (ref 3.5–5.1)
NON-UH HIE KETONES, U: ABNORMAL
NON-UH HIE LEUKOCYTE ESTERASE, U: ABNORMAL
NON-UH HIE LYMPH %: 24.3 %
NON-UH HIE LYMPH COUNT: 1.89 X1000 (ref 1.2–4.8)
NON-UH HIE MAGNESIUM: 1.8 MG/DL (ref 1.6–2.6)
NON-UH HIE MCH: 28.8 PG (ref 27–34)
NON-UH HIE MCHC: 32.5 G/DL (ref 32–37)
NON-UH HIE MCV: 88.7 FL (ref 80–100)
NON-UH HIE MICROALBUMIN, URINE MG/L: 1056 MG/L
NON-UH HIE MICROALBUMIN/CREATININE RATIO: 3017 MG MALB/GM CREAT (ref 0–30)
NON-UH HIE MONO %: 8.2 %
NON-UH HIE MONO COUNT: 0.64 X1000 (ref 0.1–1)
NON-UH HIE MPV: 8.1 FL (ref 7.4–10.4)
NON-UH HIE NA: 140 MMOL/L (ref 135–145)
NON-UH HIE NEUTROPHIL %: 51.4 %
NON-UH HIE NEUTROPHIL COUNT (ANC): 4 X1000 (ref 1.4–8.8)
NON-UH HIE NITRITE, U: ABNORMAL
NON-UH HIE NUCLEATED RBC: 0 /100WBC
NON-UH HIE PH, U: 5.5 (ref 4.5–8)
NON-UH HIE PHOSPHORUS: 4.8 MG/DL (ref 2–5.1)
NON-UH HIE PLATELET: 485 X10 (ref 150–450)
NON-UH HIE PROTEIN, U: ABNORMAL
NON-UH HIE RBC/HPF, U: 1 #/HPF (ref 0–3)
NON-UH HIE RBC: 4.19 X10 (ref 4.2–5.4)
NON-UH HIE RDW: 16.2 % (ref 11.5–14.5)
NON-UH HIE RED BLOOD CELL MORPHOLOGY: ABNORMAL
NON-UH HIE SPECIFIC GRAVITY, U: 1.02 (ref 1–1.03)
NON-UH HIE SQUAMOUS EPITHELIAL CELLS, U: <1 #/HPF
NON-UH HIE U MICRO: ABNORMAL
NON-UH HIE URIC ACID: 4.5 MG/DL (ref 3.1–7.8)
NON-UH HIE UROBILINOGEN QUAL, U: ABNORMAL
NON-UH HIE VIT D 25: 66 NG/ML
NON-UH HIE WBC/HPF, U: 1 #/HPF (ref 0–5)
NON-UH HIE WBC: 7.8 X10 (ref 4.5–11)

## 2025-04-02 ENCOUNTER — APPOINTMENT (OUTPATIENT)
Dept: PRIMARY CARE | Facility: CLINIC | Age: 65
End: 2025-04-02
Payer: COMMERCIAL

## 2025-04-02 VITALS
HEIGHT: 61 IN | OXYGEN SATURATION: 96 % | HEART RATE: 64 BPM | TEMPERATURE: 97.5 F | SYSTOLIC BLOOD PRESSURE: 159 MMHG | DIASTOLIC BLOOD PRESSURE: 67 MMHG | BODY MASS INDEX: 23.24 KG/M2

## 2025-04-02 DIAGNOSIS — I10 BENIGN ESSENTIAL HYPERTENSION: ICD-10-CM

## 2025-04-02 DIAGNOSIS — R80.9 MICROALBUMINURIA DUE TO TYPE 2 DIABETES MELLITUS (MULTI): ICD-10-CM

## 2025-04-02 DIAGNOSIS — Z87.891 ENCOUNTER FOR SCREENING FOR ABDOMINAL AORTIC ANEURYSM (AAA) IN PATIENT 50 YEARS OF AGE OR OLDER WITH HISTORY OF SMOKING: ICD-10-CM

## 2025-04-02 DIAGNOSIS — Z98.61 CAD S/P PERCUTANEOUS CORONARY ANGIOPLASTY: ICD-10-CM

## 2025-04-02 DIAGNOSIS — Z12.31 ENCOUNTER FOR SCREENING MAMMOGRAM FOR MALIGNANT NEOPLASM OF BREAST: ICD-10-CM

## 2025-04-02 DIAGNOSIS — Z13.6 ENCOUNTER FOR SCREENING FOR ABDOMINAL AORTIC ANEURYSM (AAA) IN PATIENT 50 YEARS OF AGE OR OLDER WITH HISTORY OF SMOKING: ICD-10-CM

## 2025-04-02 DIAGNOSIS — I73.9 PAD (PERIPHERAL ARTERY DISEASE) (CMS-HCC): ICD-10-CM

## 2025-04-02 DIAGNOSIS — E11.29 MICROALBUMINURIA DUE TO TYPE 2 DIABETES MELLITUS (MULTI): ICD-10-CM

## 2025-04-02 DIAGNOSIS — E78.2 MIXED HYPERLIPIDEMIA: ICD-10-CM

## 2025-04-02 DIAGNOSIS — Z00.00 VISIT FOR PREVENTIVE HEALTH EXAMINATION: Primary | ICD-10-CM

## 2025-04-02 DIAGNOSIS — J43.2 CENTRILOBULAR EMPHYSEMA (MULTI): ICD-10-CM

## 2025-04-02 DIAGNOSIS — I25.10 CAD S/P PERCUTANEOUS CORONARY ANGIOPLASTY: ICD-10-CM

## 2025-04-02 LAB — POC HEMOGLOBIN A1C: 8.3 % (ref 4.2–6.5)

## 2025-04-02 PROCEDURE — 83036 HEMOGLOBIN GLYCOSYLATED A1C: CPT | Performed by: FAMILY MEDICINE

## 2025-04-02 PROCEDURE — 1159F MED LIST DOCD IN RCRD: CPT | Performed by: FAMILY MEDICINE

## 2025-04-02 PROCEDURE — 4010F ACE/ARB THERAPY RXD/TAKEN: CPT | Performed by: FAMILY MEDICINE

## 2025-04-02 PROCEDURE — 3078F DIAST BP <80 MM HG: CPT | Performed by: FAMILY MEDICINE

## 2025-04-02 PROCEDURE — 99397 PER PM REEVAL EST PAT 65+ YR: CPT | Performed by: FAMILY MEDICINE

## 2025-04-02 PROCEDURE — 3077F SYST BP >= 140 MM HG: CPT | Performed by: FAMILY MEDICINE

## 2025-04-02 PROCEDURE — 1160F RVW MEDS BY RX/DR IN RCRD: CPT | Performed by: FAMILY MEDICINE

## 2025-04-02 PROCEDURE — 1036F TOBACCO NON-USER: CPT | Performed by: FAMILY MEDICINE

## 2025-04-02 PROCEDURE — 99214 OFFICE O/P EST MOD 30 MIN: CPT | Performed by: FAMILY MEDICINE

## 2025-04-02 RX ORDER — SENNOSIDES 8.6 MG
71.5 TABLET ORAL 2 TIMES DAILY
COMMUNITY
Start: 2025-03-21

## 2025-04-02 NOTE — PROGRESS NOTES
Subjective   Patient ID: Merissa English is a 65 y.o. female who presents for Annual Exam.    Assessment/Plan     Problem List Items Addressed This Visit       Hyperlipidemia    Benign essential hypertension    CAD S/P percutaneous coronary angioplasty    Chronic obstructive lung disease (Multi)    Microalbuminuria due to type 2 diabetes mellitus (Multi)    Relevant Medications    dulaglutide (Trulicity) 3 mg/0.5 mL injection    Other Relevant Orders    POCT glycosylated hemoglobin (Hb A1C) manually resulted (Completed)    Hepatic function panel    TSH with reflex to Free T4 if abnormal    Lipid Panel    PAD (peripheral artery disease) (CMS-HCC)    Visit for preventive health examination - Primary    Relevant Orders    Hepatic function panel    TSH with reflex to Free T4 if abnormal    Lipid Panel     Other Visit Diagnoses       Encounter for screening mammogram for malignant neoplasm of breast        Relevant Orders    BI mammo bilateral screening tomosynthesis    Encounter for screening for abdominal aortic aneurysm (AAA) in patient 50 years of age or older with history of smoking        Relevant Orders    Vascular US Abdominal Aorta Aneurysm AAA Screening            Prevnar 20 previous visit  Cologuard was negative 4/23  CT chest done in April 2023 reviewed stable finding of centrilobular emphysema--done yesterday will follow-up on results  Follow-up in 6 months consider wellness at that time  AAA screening requisition provided      Consider carotid Doppler- done in 2024 mild disease bilaterally      Mammogram in June 2021 within normal limits-ordered today  Discussed about shingles and RSV  GI information provided 4 EGD colonoscopy patient did not go    Follow-up in 4 months  Discussed about healthy lifestyle diet exercise  Continue current medication  Advised to follow-up with OB/GYN for Pap smear  Advised to follow-up with ophthalmology    Received flu vaccine     Schedule follow-up with OB/GYN for Pap  smear    HPI    65-year-old female here for physical and follow-up on chronic medical conditions    MARY reviewed showed  Right 0.76  Left 0.62    Discussed in detail patient is asymptomatic - med mx - no ref for now  Hemoglobin A1c 8.0->7.6->8.4->8.3  Again discussed about diet exercise    Recent lab work done by nephrology reviewed  Currently on Kerendia ACE and Jardiance  Will increase Trulicity today    Reminded her to see opthal     NSTEMi.  on aspirin, statin, beta blocker, lisinopril , plavix.   Echo showed ejection fraction 30-35 percent with lateral and apical wall akinesis in 2/18 - recent echo showed ef 50-55% - Lasix has been discontinued by patient - no edema no shortness of breath at this point     Cardiac catheter revealed status post mild LAD SAMUEL - on 2/27/2018,  hypertension. Continue carvedilol and lisinopril.  3. Hyperlipidemia. Continue Crestor 40 mg with fenofibrate.  4. Diabetes. cont metformin. Continue glipizide and synjardy and Trulicity  5. Generalized anxiety disorder, was on p.r.n. Xanax.      History of smoking more than 30 pack per year stopped in 2017     No hypoglycemia     Chronic constipation  Possible COPD emphysema  Anxiety depression  Cataract  Hearing loss  Wearing dentures       No Known Allergies    Current Outpatient Medications   Medication Sig Dispense Refill    Accu-Chek Fastclix Lancet Drum misc 2 times a day.      Accu-Chek Guide test strips strip USE 1 STRIP TWICE DAILY 100 strip 3    amLODIPine (Norvasc) 10 mg tablet Take 1 tablet (10 mg) by mouth once daily. 90 tablet 2    aspirin 81 mg EC tablet Take 1 tablet (81 mg) by mouth once daily.      blood sugar diagnostic (Accu-Chek Nina Plus test strp) strip once daily.      carvedilol (Coreg) 12.5 mg tablet Take 1 tablet (12.5 mg) by mouth 2 times daily (morning and late afternoon). 180 tablet 2    clopidogrel (Plavix) 75 mg tablet Take 1 tablet (75 mg) by mouth once daily. 90 tablet 2    empagliflozin-metformin  "(Synjardy) 5-1,000 mg Take 1 tablet by mouth 2 times a day. Need to increase the dose on next visit 180 tablet 2    ergocalciferol (Vitamin D-2) 1.25 MG (22064 UT) capsule Take 1 capsule (1,250 mcg) by mouth 1 (one) time per week.      fenofibrate (Tricor) 145 mg tablet Take 1 tablet (145 mg) by mouth once daily. 90 tablet 2    glipiZIDE (Glucotrol) 10 mg tablet Take 1 tablet (10 mg) by mouth 2 times a day before meals. 180 tablet 2    iron polysaccharides (Nu-Iron,Niferex) 150 mg iron capsule Take 1 capsule (150 mg) by mouth 2 times a day. 180 capsule 1    Kerendia 10 mg tablet tablet Take 1 tablet (10 mg) by mouth early in the morning..      lisinopril 20 mg tablet Take 1 tablet (20 mg) by mouth once daily at bedtime.      lisinopril 40 mg tablet Take 1 tablet (40 mg) by mouth once daily. 90 tablet 2    nitroglycerin (Nitrostat) 0.4 mg SL tablet Place under the tongue.      rosuvastatin (Crestor) 40 mg tablet TAKE ONE TABLET BY MOUTH ONCE DAILY 90 tablet 2    Slow-Mag 71.5 mg tablet,delayed release (DR/EC) Take 1 tablet (71.5 mg) by mouth twice a day.      dulaglutide (Trulicity) 3 mg/0.5 mL injection Inject 3 mg under the skin 1 (one) time per week. 2 mL 5     No current facility-administered medications for this visit.       Objective   Visit Vitals  /67 (BP Location: Left arm, Patient Position: Sitting)   Pulse 64   Temp 36.4 °C (97.5 °F)   Ht 1.549 m (5' 1\")   SpO2 96%   BMI 23.24 kg/m²   Smoking Status Former   BSA 1.55 m²     Physical Exam  Cardiovascular:      Pulses:           Dorsalis pedis pulses are 0 on the right side and 0 on the left side.        Posterior tibial pulses are 0 on the right side and 0 on the left side.   Musculoskeletal:      Right foot: Normal range of motion. No deformity, bunion or foot drop.      Left foot: Normal range of motion. No deformity, bunion or foot drop.   Feet:      Right foot:      Protective Sensation: 5 sites tested.  5 sites sensed.      Skin integrity: Skin " integrity normal.      Toenail Condition: Right toenails are normal.      Left foot:      Protective Sensation: 5 sites tested.  5 sites sensed.      Skin integrity: Skin integrity normal.      Toenail Condition: Left toenails are normal.         Constitutional   General appearance: Alert and in no acute distress.   Head and Face   Head and face: Normal.     Palpation of the face and sinuses: Normal.    Eyes   Inspection of eyes: Sclera and conjunctiva were normal.    Pupil exam: Pupils were equal in size. Extraocular movements were intact.   Ears, Nose, Mouth, and Throat   Ears: Auricles: Normal.    Otoscopic examination: Tympanic membranes: Normal with no congestion and no discharge. Otic Canals: Normal without tenderness, congestion or discharge.    Hearing: Normal.     Nasal mucosa, septum, and turbinates: Normal without edema or erythema.    Lips, teeth, and gums: Normal.    Oropharynx: Normal with moist mucus membranes, no congestion. Tonsils: Normal no follicles.   Neck   Neck Exam: Appearance of the neck was normal. No neck masses observed.    Thyroid exam: Not enlarged and no palpable thyroid nodules.   Pulmonary   Respiratory assessment: No respiratory distress, normal respiratory rhythm and effort.    Auscultation of Lungs: Clear bilateral breath sounds.   Cardiovascular   Auscultation of heart: Apical pulse normal, heart rate and rhythm normal, normal S1 and S2, no murmurs and no pericardial rub.    Carotid arteries: Pulses normal with no bruits.    Exam for edema: No peripheral edema.   Chest   Chest: Normal A_P diameter, no pulsation, no intercostal withdrawing. Trachea central.   Abdomen   Abdominal Exam: No bruits, normal bowel sounds, soft, non-tender, no abdominal mass palpated.    Liver and Spleen exam: No hepato-splenomegaly.    Examination for hernias: Normal.      Lymphatic   Palpation of lymph nodes in neck: No cervical lymphadenopathy.   Musculoskeletal   Examination of gait: Normal.     Inspection of digits and nails: No clubbing or cyanosis of the fingernails.    Inspection/palpation of joints, bones and muscles: No joint swelling. Normal movement of all extremities.    Range of Motion: Normal movement of all extremities.   Skin   Skin inspection: Normal skin color and pigmentation, normal skin turgor and no visible rash.   Neurologic   Cranial nerves: Nerves 2-12 were intact, no focal neuro defects.    Reflexes: Normal.     Sensation: Normal.     Coordination: Normal.    Psychiatric   Judgment and insight: Intact.    Orientation: Oriented to person, place, and time.    Recent and remote memory: Normal.     Mood and affect: Normal.     Genitourinary -follow-up with OB/GYN     2/6 sys mur mur radiating to left > right carotid  Immunization History   Administered Date(s) Administered    Flu vaccine (IIV4), preservative free *Check age/dose* 11/16/2021    Flu vaccine, quadrivalent, no egg protein, age 6 month or greater (FLUCELVAX) 09/27/2020, 09/23/2022    Influenza, Seasonal, Quadrivalent, Adjuvanted 09/24/2023    Influenza, injectable, quadrivalent 09/05/2017, 11/07/2019    Influenza, seasonal, injectable 09/05/2017    Influenza, trivalent, adjuvanted 09/24/2024    Moderna COVID-19 vaccine, bivalent, blue cap/gray label *Check age/dose* 09/27/2022    Moderna SARS-CoV-2 Vaccination 03/28/2021, 04/26/2021, 12/15/2021    Pneumococcal conjugate vaccine, 20-valent (PREVNAR 20) 12/22/2022    Pneumococcal polysaccharide vaccine, 23-valent, age 2 years and older (PNEUMOVAX 23) 09/05/2017       Review of Systems    Office Visit on 04/02/2025   Component Date Value Ref Range Status    POC HEMOGLOBIN A1c 04/02/2025 8.3 (A)  4.2 - 6.5 % Final   Orders Only on 03/22/2025   Component Date Value Ref Range Status    [facility] Platelet 03/22/2025 485 (H)  150 - 450 x10 Final    [facility] RBC 03/22/2025 4.19 (L)  4.20 - 5.40 x10 Final    [facility] Instr WBC 03/22/2025 7.8   Final    [facility] MCHC 03/22/2025  32.5  32.0 - 37.0 g/dL Final    [facility] MCV 03/22/2025 88.7  80.0 - 100.0 fL Final    [facility] MPV 03/22/2025 8.1  7.4 - 10.4 fL Final    [facility] HGB 03/22/2025 12.1  12.0 - 16.0 g/dL Final    [facility] WBC 03/22/2025 7.8  4.5 - 11.0 x10 Final    [facility] RDW 03/22/2025 16.2 (H)  11.5 - 14.5 % Final    [facility] MCH 03/22/2025 28.8  27.0 - 34.0 pg Final    [facility] Nucleated RBC 03/22/2025 0  /100WBC Final    [facility] HCT 03/22/2025 37.2  36.0 - 46.0 % Final    [facility] DIFF? 03/22/2025 No^NO   Final    [facility] Eosin % 03/22/2025 14.7  % Final    [facility] Red Blood Cell Morpholo* 03/22/2025 See Notes^See Notes (A)   Final    [facility] Eos Count 03/22/2025 1.14 (H)  0.00 - 0.50 x1000 Final    [facility] Lymph % 03/22/2025 24.3  % Final    [facility] Lymph Count 03/22/2025 1.89  1.20 - 4.80 x1000 Final    [facility] Basos % 03/22/2025 1.3  % Final    [facility] Mono % 03/22/2025 8.2  % Final    [facility] Baso Count 03/22/2025 0.10  0.00 - 0.20 x1000 Final    [facility] Mono Count 03/22/2025 0.64  0.10 - 1.00 x1000 Final    [facility] Neutrophil % 03/22/2025 51.4  % Final    [facility] Neutrophil Count (ANC) 03/22/2025 4.00  1.40 - 8.80 x1000 Final    [facility] Nitrite, U 03/22/2025 Negative^NEGATIVE  Negative Final    [facility] Bilirubin, U 03/22/2025 Negative^NEGATIVE  Negative Final    [facility] Appearance, U 03/22/2025 Clear^Clear  Clear Final    [facility] Protein, U 03/22/2025 100 mg/dl^100 (A)  Negative Final    [facility] WBC/HPF, U 03/22/2025 1  0 - 5 #/HPF Final    [facility] Blood, U 03/22/2025 Negative^NEGATIVE  Negative Final    [facility] Leukocyte Esterase, U 03/22/2025 Negative^NEGATIVE  Negative Final    [facility] Ketones, U 03/22/2025 Negative^NEGATIVE  Negative Final    [facility] Glucose Qual, U 03/22/2025 > 1000 mg/dl^OVER (A)  Negative Final    [facility] Urobilinogen Qual, U 03/22/2025 < 2 mg/dl^Normal  < 2 mg/dl Final    [facility] Squamous Epithelial Audrey* 03/22/2025  <1  #/HPF Final    [facility] Color, U 03/22/2025 Light-Yellow^Light-Yellow  Yellow Final    [facility] pH, U 03/22/2025 5.5  4.5 - 8.0 Final    [facility] RBC/HPF, U 03/22/2025 1  0 - 3 #/HPF Final    [facility] Specific Gravity, U 03/22/2025 1.023  1.001 - 1.035 Final    [facility] U MICRO 03/22/2025 Indicated^IND   Final    [facility] Magnesium 03/22/2025 1.8  1.6 - 2.6 mg/dL Final    [facility] Uric Acid 03/22/2025 4.5  3.1 - 7.8 mg/dL Final    [facility] Phosphorus 03/22/2025 4.8  2.0 - 5.1 mg/dL Final    [facility] Calculated Osmolality 03/22/2025 285  275 - 295 mOsm/kg Final    [facility] GFR AA 03/22/2025 >60   Final    [facility] BUN/Creat Ratio 03/22/2025 25.7   Final    [facility] ALB 03/22/2025 3.2 (L)  3.4 - 5.0 g/dL Final    [facility] Chloride 03/22/2025 108 (H)  98 - 107 mmol/L Final    [facility] Corrected Calcium 03/22/2025 10.2  8.5 - 10.5 mg/dL Final    [facility] K 03/22/2025 5.4 (H)  3.5 - 5.1 mmol/L Final    [facility] Creatinine 03/22/2025 0.7  0.5 - 0.8 mg/dL Final    [facility] Na 03/22/2025 140  135 - 145 mmol/L Final    [facility] Glucose 03/22/2025 155 (H)  74 - 106 mg/dL Final    [facility] CO2, venous 03/22/2025 19.0 (L)  20.0 - 31.0 mmol/L Final    [facility] BUN 03/22/2025 18  9 - 23 mg/dL Final    [facility] Glomerular Filtration R* 03/22/2025 >60  mL/min/1.73m? Final    [facility] Calcium 03/22/2025 9.6  8.7 - 10.4 mg/dL Final    [facility] Vit D 25 03/22/2025 66  ng/mL Final    [facility] I-PTH 03/22/2025 73.0  18.4 - 80.1 pg/mL Final    [facility] Microalbumin/Creatinine* 03/22/2025 3,017 (H)  0 - 30 mg MALB/gm CREAT Final    [facility] Creatinine, Urine mg/dl 03/22/2025 35.0  mg/dL Final    [facility] Microalbumin, Urine mg/L 03/22/2025 1,056.0  mg/L Final       Radiology: Reviewed imaging in powerchart.  CT lung screening low dose    Result Date: 4/3/2023  CT  OF THE CHEST  CLINICAL INDICATION:  Former smoker. Screening. FINDINGS:  Serial axial images from the thoracic inlet to  the upper abdomen were obtained without administration of contrast with low-dose screen technique.   Comparison is made to prior CT obtained on 7/6/2022. Coronal and sagittal reformatted images were generated from the axial data set. This exam was performed according to our departmental dose optimization program and includes the following measures when applicable: Automated exposure control, adjustment of the MAS and or KVP according to the patient size and are exam and an iterative reconstruction algorithm. The heart and mediastinal vascular structures are  again remarkable for the presence of calcified plaques in the coronary arteries and thoracic aorta..  There are no axillary, hilar or mediastinal abnormally enlarged lymph nodes.  There is no evidence of mediastinal masses. Again the lungs are hyperexpanded and hyperlucent with flattening of the diaphragms and increased retrosternal space. . Small focal area of subsegmental atelectasis in the posterior right lung base is not identified.  There is no evidence of  pleural effusion.  The pulmonary vasculature is normal.  There are no intrapulmonary nodules or masses.  The airways are normal. However, the right lower lobe posterior basal segmental bronchi is completely filled and occluded most likely representing a mucous plug. This finding was not present on previous CT. Within the limitations of the exam the visualize segment of the upper abdomen is unremarkable. No evidence of osteoblastic or osteolytic lesions is identified in bones of the chest. Again mild osteoarthritic changes of the thoracic spine are noted. IMPRESSION: Stable findings of centrilobular emphysema. Occlusion of the posterior basal segmental bronchus of the right lower lobe most likely representing mucous plug. There is secondary small focal area of subsegmental atelectasis at the lung base. Clinical correlation is suggested to determine the significance of this finding. Stable calcified  atherosclerosis of the coronary arteries and thoracic aorta. Nodules seen on previous CT in the right lower lobe, is no longer seen. Lung RADS category 1. Recommendations: Continue yearly screening with LD CT Electronically signed by:  Taylor Garay MD  2023 9:38 AM CDT Workstation: [id number] Technologist:  EMIGDIO DIETZ Dictated By:   TAYLOR GARAY MD Signed By:     TAYLOR GARAY MD Signed Out:    23 10:38:56      No family history on file.  Social History     Socioeconomic History    Marital status:    Tobacco Use    Smoking status: Former     Current packs/day: 1.50     Average packs/day: 1.5 packs/day for 15.0 years (22.5 ttl pk-yrs)     Types: Cigarettes    Smokeless tobacco: Never   Substance and Sexual Activity    Alcohol use: Never    Drug use: Never     Past Medical History:   Diagnosis Date    Unspecified systolic (congestive) heart failure 2018    Systolic CHF     Past Surgical History:   Procedure Laterality Date    CATARACT EXTRACTION  2016    Cataract Surgery     SECTION, CLASSIC  10/29/2014     Section    CORONARY ANGIOPLASTY WITH STENT PLACEMENT  2018    Cath Placement Of Stent 1    LYMPH NODE BIOPSY  2016    Biopsy Lymph Node       Charting was completed using voice recognition technology and may include unintended errors.

## 2025-04-07 ENCOUNTER — APPOINTMENT (OUTPATIENT)
Dept: PRIMARY CARE | Facility: CLINIC | Age: 65
End: 2025-04-07
Payer: COMMERCIAL

## 2025-04-12 LAB
NON-UH HIE ALB: 3 G/DL (ref 3.4–5)
NON-UH HIE ALB: 3.1 G/DL (ref 3.4–5)
NON-UH HIE ALK PHOS: 38 UNIT/L (ref 45–117)
NON-UH HIE BILIRUBIN, DIRECT: <0.1 MG/DL (ref 0–0.4)
NON-UH HIE BILIRUBIN, TOTAL: 0.2 MG/DL (ref 0.3–1.2)
NON-UH HIE BUN/CREAT RATIO: 28.3
NON-UH HIE BUN: 17 MG/DL (ref 9–23)
NON-UH HIE CALCIUM: 9.5 MG/DL (ref 8.7–10.4)
NON-UH HIE CALCULATED LDL CHOLESTEROL: 64 MG/DL (ref 60–130)
NON-UH HIE CALCULATED OSMOLALITY: 291 MOSM/KG (ref 275–295)
NON-UH HIE CHLORIDE: 111 MMOL/L (ref 98–107)
NON-UH HIE CHOLESTEROL: 132 MG/DL (ref 100–200)
NON-UH HIE CO2, VENOUS: 23 MMOL/L (ref 20–31)
NON-UH HIE CORRECTED CALCIUM: 10.3 MG/DL (ref 8.5–10.5)
NON-UH HIE CREATININE: 0.6 MG/DL (ref 0.5–0.8)
NON-UH HIE GFR AA: >60
NON-UH HIE GLOMERULAR FILTRATION RATE: >60 ML/MIN/1.73M?
NON-UH HIE GLUCOSE: 152 MG/DL (ref 74–106)
NON-UH HIE GOT: 13 UNIT/L (ref 15–37)
NON-UH HIE GPT: 12 UNIT/L (ref 10–49)
NON-UH HIE HDL CHOLESTEROL: 41 MG/DL (ref 40–60)
NON-UH HIE K: 4.3 MMOL/L (ref 3.5–5.1)
NON-UH HIE NA: 144 MMOL/L (ref 135–145)
NON-UH HIE PHOSPHORUS: 4.4 MG/DL (ref 2–5.1)
NON-UH HIE TOTAL CHOL/HDL CHOL RATIO: 3.2
NON-UH HIE TOTAL PROTEIN: 6.2 G/DL (ref 5.7–8.2)
NON-UH HIE TRIGLYCERIDES: 137 MG/DL (ref 30–150)

## 2025-05-13 ENCOUNTER — TELEPHONE (OUTPATIENT)
Dept: PRIMARY CARE | Facility: CLINIC | Age: 65
End: 2025-05-13
Payer: COMMERCIAL

## 2025-05-13 DIAGNOSIS — E11.29 MICROALBUMINURIA DUE TO TYPE 2 DIABETES MELLITUS (MULTI): ICD-10-CM

## 2025-05-13 DIAGNOSIS — E78.5 HYPERLIPIDEMIA, UNSPECIFIED HYPERLIPIDEMIA TYPE: ICD-10-CM

## 2025-05-13 DIAGNOSIS — I10 HYPERTENSION, UNSPECIFIED TYPE: ICD-10-CM

## 2025-05-13 DIAGNOSIS — R80.9 MICROALBUMINURIA DUE TO TYPE 2 DIABETES MELLITUS (MULTI): ICD-10-CM

## 2025-05-13 NOTE — TELEPHONE ENCOUNTER
PT IS REQUESTING 90 DAY SUPPLY OF MEDICATION. PT  WAS LAID OFF AND HAS INS STILL. ANY QUESTIONS CHECK WITH PT    WOULD LIKE ALL ORDERED PER PT START 12/24 SEND TO [facility]

## 2025-05-14 RX ORDER — FENOFIBRATE 145 MG/1
145 TABLET, FILM COATED ORAL DAILY
Qty: 90 TABLET | Refills: 2 | Status: SHIPPED | OUTPATIENT
Start: 2025-05-14

## 2025-05-14 RX ORDER — LISINOPRIL 40 MG/1
40 TABLET ORAL
Qty: 90 TABLET | Refills: 2 | Status: SHIPPED | OUTPATIENT
Start: 2025-05-14 | End: 2025-05-15 | Stop reason: SDUPTHER

## 2025-05-14 RX ORDER — GLIPIZIDE 10 MG/1
10 TABLET ORAL
Qty: 180 TABLET | Refills: 2 | Status: SHIPPED | OUTPATIENT
Start: 2025-05-14 | End: 2026-05-14

## 2025-05-14 RX ORDER — EMPAGLIFLOZIN AND METFORMIN HYDROCHLORIDE 5; 1000 MG/1; MG/1
1 TABLET ORAL 2 TIMES DAILY
Qty: 180 TABLET | Refills: 2 | Status: SHIPPED | OUTPATIENT
Start: 2025-05-14

## 2025-05-15 ENCOUNTER — TELEPHONE (OUTPATIENT)
Dept: PRIMARY CARE | Facility: CLINIC | Age: 65
End: 2025-05-15

## 2025-05-15 ENCOUNTER — TELEPHONE (OUTPATIENT)
Dept: PRIMARY CARE | Facility: CLINIC | Age: 65
End: 2025-05-15
Payer: COMMERCIAL

## 2025-05-15 DIAGNOSIS — R80.9 MICROALBUMINURIA DUE TO TYPE 2 DIABETES MELLITUS (MULTI): ICD-10-CM

## 2025-05-15 DIAGNOSIS — D50.9 IRON DEFICIENCY ANEMIA, UNSPECIFIED IRON DEFICIENCY ANEMIA TYPE: ICD-10-CM

## 2025-05-15 DIAGNOSIS — I73.9 PAD (PERIPHERAL ARTERY DISEASE): ICD-10-CM

## 2025-05-15 DIAGNOSIS — E11.29 MICROALBUMINURIA DUE TO TYPE 2 DIABETES MELLITUS (MULTI): ICD-10-CM

## 2025-05-15 DIAGNOSIS — I10 HYPERTENSION, UNSPECIFIED TYPE: ICD-10-CM

## 2025-05-15 RX ORDER — CARVEDILOL 12.5 MG/1
12.5 TABLET ORAL
Qty: 180 TABLET | Refills: 3 | Status: SHIPPED | OUTPATIENT
Start: 2025-05-15

## 2025-05-15 RX ORDER — LISINOPRIL 40 MG/1
40 TABLET ORAL
Qty: 90 TABLET | Refills: 3 | Status: SHIPPED | OUTPATIENT
Start: 2025-05-15 | End: 2026-05-15

## 2025-05-15 RX ORDER — AMLODIPINE BESYLATE 10 MG/1
10 TABLET ORAL DAILY
Qty: 90 TABLET | Refills: 3 | Status: SHIPPED | OUTPATIENT
Start: 2025-05-15 | End: 2026-05-15

## 2025-05-15 RX ORDER — IRON POLYSACCHARIDE COMPLEX 150 MG
150 CAPSULE ORAL 2 TIMES DAILY
Qty: 180 CAPSULE | Refills: 3 | Status: SHIPPED | OUTPATIENT
Start: 2025-05-15

## 2025-05-15 RX ORDER — CLOPIDOGREL BISULFATE 75 MG/1
75 TABLET ORAL DAILY
Qty: 90 TABLET | Refills: 3 | Status: SHIPPED | OUTPATIENT
Start: 2025-05-15

## 2025-05-15 NOTE — TELEPHONE ENCOUNTER
TRULICITY 3MG/0.5 90 DAY NEEDED. PT  WAS LAID OFF. INSURANCE WILL  SOON SO ASKING FOR 90  DAY    [facility]

## 2025-05-15 NOTE — TELEPHONE ENCOUNTER
Patient called again, I let her know that 4 medications were refilled. She wants to know if Dr. Pearson is agreeable to fill these medications as well for 3 months.    Iron polysaccharides 150 mg2 times a day  Clopidogrel 75 mg 1 time a day  Amlodipine 10 mg 1 time a day  Rosuvastatin 40 mg 1 time a day  Carvedilol 12.5 mg 2 times a day      Patient is currently signing up for medicare A and B

## 2025-06-04 ENCOUNTER — TELEPHONE (OUTPATIENT)
Dept: PRIMARY CARE | Facility: CLINIC | Age: 65
End: 2025-06-04

## 2025-08-06 ENCOUNTER — APPOINTMENT (OUTPATIENT)
Dept: PRIMARY CARE | Facility: CLINIC | Age: 65
End: 2025-08-06
Payer: COMMERCIAL

## 2025-08-06 VITALS
DIASTOLIC BLOOD PRESSURE: 70 MMHG | WEIGHT: 126 LBS | SYSTOLIC BLOOD PRESSURE: 190 MMHG | HEIGHT: 61 IN | BODY MASS INDEX: 23.79 KG/M2 | OXYGEN SATURATION: 96 % | HEART RATE: 71 BPM | TEMPERATURE: 98 F

## 2025-08-06 DIAGNOSIS — Z98.61 CAD S/P PERCUTANEOUS CORONARY ANGIOPLASTY: ICD-10-CM

## 2025-08-06 DIAGNOSIS — R80.9 MICROALBUMINURIA DUE TO TYPE 2 DIABETES MELLITUS (MULTI): Primary | ICD-10-CM

## 2025-08-06 DIAGNOSIS — E78.2 MIXED HYPERLIPIDEMIA: ICD-10-CM

## 2025-08-06 DIAGNOSIS — E11.29 MICROALBUMINURIA DUE TO TYPE 2 DIABETES MELLITUS (MULTI): Primary | ICD-10-CM

## 2025-08-06 DIAGNOSIS — I10 BENIGN ESSENTIAL HYPERTENSION: ICD-10-CM

## 2025-08-06 DIAGNOSIS — I25.10 CAD S/P PERCUTANEOUS CORONARY ANGIOPLASTY: ICD-10-CM

## 2025-08-06 PROCEDURE — 99214 OFFICE O/P EST MOD 30 MIN: CPT | Performed by: FAMILY MEDICINE

## 2025-08-06 PROCEDURE — 3008F BODY MASS INDEX DOCD: CPT | Performed by: FAMILY MEDICINE

## 2025-08-06 PROCEDURE — 1160F RVW MEDS BY RX/DR IN RCRD: CPT | Performed by: FAMILY MEDICINE

## 2025-08-06 PROCEDURE — 3078F DIAST BP <80 MM HG: CPT | Performed by: FAMILY MEDICINE

## 2025-08-06 PROCEDURE — 4010F ACE/ARB THERAPY RXD/TAKEN: CPT | Performed by: FAMILY MEDICINE

## 2025-08-06 PROCEDURE — 3052F HG A1C>EQUAL 8.0%<EQUAL 9.0%: CPT | Performed by: FAMILY MEDICINE

## 2025-08-06 PROCEDURE — 1159F MED LIST DOCD IN RCRD: CPT | Performed by: FAMILY MEDICINE

## 2025-08-06 PROCEDURE — 3077F SYST BP >= 140 MM HG: CPT | Performed by: FAMILY MEDICINE

## 2025-08-06 NOTE — PROGRESS NOTES
Subjective   Patient ID: Merissa English is a 65 y.o. female who presents for Follow-up.    Assessment/Plan     Problem List Items Addressed This Visit       Hyperlipidemia    Benign essential hypertension    CAD S/P percutaneous coronary angioplasty    Microalbuminuria due to type 2 diabetes mellitus (Multi) - Primary         Prevnar 20 previous visit  Cologuard was negative 4/23  CT chest done in April 2023 reviewed stable finding of centrilobular emphysema--done yesterday will follow-up on results  Follow-up in 6 months consider wellness at that time  AAA screening requisition provided pended again  Reminded again for mammogram    Consider carotid Doppler- done in 2024 mild disease bilaterally      Mammogram in June 2021 within normal limits-ordered on previous visit  Discussed about shingles and RSV  GI information provided 4 EGD colonoscopy patient did not go    Follow-up in December  Discussed about healthy lifestyle diet exercise  Continue current medication  Advised to follow-up with OB/GYN for Pap smear  Advised to follow-up with ophthalmology    Received flu vaccine     Schedule follow-up with OB/GYN for Pap smear    HPI    65-year-old female here for follow-up on chronic medical conditions      No new s/s  No hyypoglycemia    Advised to check BP at home  Call us if blood pressure is running high will add hydralazine    MARY reviewed showed  Right 0.76  Left 0.62    Discussed in detail patient is asymptomatic - med mx - no ref for now  Hemoglobin A1c 8.0->7.6->8.4->8.3-> 8.0  Again discussed about diet exercise    Recent lab work done by nephrology reviewed  Currently on Kerendia ACE and Jardiance      Reminded her to see opthal    NSTEMi.  on aspirin, statin, beta blocker, lisinopril , plavix.  Echo showed ejection fraction 30-35 percent with lateral and apical wall akinesis in 2/18 - recent echo showed ef 50-55% - Lasix has been discontinued by patient - no edema no shortness of breath at this point      Cardiac catheter revealed status post mild LAD SAMUEL - on 2/27/2018,  hypertension. Continue carvedilol and lisinopril.  3. Hyperlipidemia. Continue Crestor 40 mg with fenofibrate.  4. Diabetes. cont metformin. Continue glipizide and synjardy and Trulicity  5. Generalized anxiety disorder, was on p.r.n. Xanax.      History of smoking more than 30 pack per year stopped in 2017     No hypoglycemia     Chronic constipation  Possible COPD emphysema  Anxiety depression  Cataract  Hearing loss  Wearing dentures       No Known Allergies    Current Outpatient Medications   Medication Sig Dispense Refill    Accu-Chek Fastclix Lancet Drum misc 2 times a day.      Accu-Chek Guide test strips strip USE 1 STRIP TWICE DAILY 100 strip 3    amLODIPine (Norvasc) 10 mg tablet Take 1 tablet (10 mg) by mouth once daily. 90 tablet 3    aspirin 81 mg EC tablet Take 1 tablet (81 mg) by mouth once daily.      blood sugar diagnostic (Accu-Chek Nina Plus test strp) strip once daily.      carvedilol (Coreg) 12.5 mg tablet Take 1 tablet (12.5 mg) by mouth 2 times daily (morning and late afternoon). 180 tablet 3    clopidogrel (Plavix) 75 mg tablet Take 1 tablet (75 mg) by mouth once daily. 90 tablet 3    dulaglutide (Trulicity) 3 mg/0.5 mL injection Inject 3 mg under the skin 1 (one) time per week. 12 each 3    empagliflozin-metformin (Synjardy) 5-1,000 mg Take 1 tablet by mouth 2 times a day. Need to increase the dose on next visit 180 tablet 2    ergocalciferol (Vitamin D-2) 1.25 MG (03115 UT) capsule Take 1 capsule (1.25 mg) by mouth 1 (one) time per week.      fenofibrate (Tricor) 145 mg tablet Take 1 tablet (145 mg) by mouth once daily. 90 tablet 2    glipiZIDE (Glucotrol) 10 mg tablet Take 1 tablet (10 mg) by mouth 2 times a day before meals. 180 tablet 2    iron polysaccharides (Nu-Iron,Niferex) 150 mg iron capsule Take 1 capsule (150 mg) by mouth 2 times a day. 180 capsule 3    Kerendia 10 mg tablet tablet Take 1 tablet (10 mg) by  "mouth early in the morning..      lisinopril 20 mg tablet Take 1 tablet (20 mg) by mouth once daily at bedtime.      lisinopril 40 mg tablet Take 1 tablet (40 mg) by mouth once daily. 90 tablet 3    nitroglycerin (Nitrostat) 0.4 mg SL tablet Place under the tongue.      rosuvastatin (Crestor) 40 mg tablet TAKE ONE TABLET BY MOUTH ONCE DAILY 90 tablet 2    Slow-Mag 71.5 mg tablet,delayed release (DR/EC) Take 1 tablet (71.5 mg) by mouth twice a day.       No current facility-administered medications for this visit.       Objective   Visit Vitals  BP (!) 190/70 (BP Location: Left arm, Patient Position: Sitting)   Pulse 71   Temp 36.7 °C (98 °F)   Ht (!) 1.549 m (5' 1\")   Wt 57.2 kg (126 lb)   SpO2 96%   BMI 23.81 kg/m²   Smoking Status Former   BSA 1.57 m²     Physical Exam    Cardiovascular:      Pulses:           Dorsalis pedis pulses are 0 on the right side and 0 on the left side.        Posterior tibial pulses are 0 on the right side and 0 on the left side.     Musculoskeletal:      Right foot: Normal range of motion. No deformity, bunion or foot drop.      Left foot: Normal range of motion. No deformity, bunion or foot drop.   Feet:      Right foot:      Protective Sensation: 5 sites tested.  5 sites sensed.      Skin integrity: Skin integrity normal.      Toenail Condition: Right toenails are normal.      Left foot:      Protective Sensation: 5 sites tested.  5 sites sensed.      Skin integrity: Skin integrity normal.      Toenail Condition: Left toenails are normal.       Constitutional:       General: He is not in acute distress.     Appearance: Normal appearance.   HENT:      Head: Normocephalic and atraumatic.      Nose: Nose normal.   Eyes:      Extraocular Movements: Extraocular movements intact.      Conjunctiva/sclera: Conjunctivae normal.   Cardiovascular:      Rate and Rhythm: Normal rate and regular rhythm.   Pulmonary:      Effort: Pulmonary effort is normal.      Breath sounds: Normal breath sounds. "   Skin:     General: Skin is warm.   Neurological:      Mental Status: He is alert and oriented to person, place, and time.   Psychiatric:         Mood and Affect: Mood normal.         Behavior: Behavior normal.       2/6 sys mur mur radiating to left > right carotid  Immunization History   Administered Date(s) Administered    Flu vaccine (IIV4), preservative free *Check age/dose* 10/17/2015, 11/16/2021    Flu vaccine, quadrivalent, no egg protein, age 6 month or greater (FLUCELVAX) 09/27/2020, 09/23/2022    Influenza, Seasonal, Quadrivalent, Adjuvanted 09/24/2023    Influenza, injectable, quadrivalent 09/05/2017, 11/07/2019    Influenza, seasonal, injectable 09/05/2017    Influenza, trivalent, adjuvanted 09/24/2024    Moderna COVID-19 vaccine, bivalent, blue cap/gray label *Check age/dose* 09/27/2022    Moderna SARS-CoV-2 Vaccination 03/28/2021, 04/26/2021, 12/15/2021    Pneumococcal conjugate vaccine, 20-valent (PREVNAR 20) 12/22/2022    Pneumococcal polysaccharide vaccine, 23-valent, age 2 years and older (PNEUMOVAX 23) 09/05/2017       Review of Systems    Orders Only on 04/12/2025   Component Date Value Ref Range Status    [facility] Bilirubin, Direct 04/12/2025 <0.10  0.00 - 0.40 mg/dL Final    [facility] Alk Phos 04/12/2025 38 (L)  45 - 117 unit/L Final    [facility] GPT 04/12/2025 12  10 - 49 unit/L Final    [facility] Total Protein 04/12/2025 6.2  5.7 - 8.2 g/dL Final    [facility] Bilirubin, Total 04/12/2025 0.20 (L)  0.30 - 1.20 mg/dL Final    [facility] GOT 04/12/2025 13 (L)  15 - 37 unit/L Final    [facility] ALB 04/12/2025 3.1 (L)  3.4 - 5.0 g/dL Final    [facility] Cholesterol 04/12/2025 132  100 - 200 mg/dL Final    [facility] Calculated LDL Choleste* 04/12/2025 64  60 - 130 mg/dL Final    [facility] HDL Cholesterol 04/12/2025 41  40 - 60 mg/dL Final    [facility] Total Chol/HDL Chol Rat* 04/12/2025 3.2   Final    [facility] Triglycerides 04/12/2025 137  30 - 150 mg/dL Final    [facility] Na  04/12/2025 144  135 - 145 mmol/L Final    [facility] BUN/Creat Ratio 04/12/2025 28.3   Final    [facility] GFR AA 04/12/2025 >60   Final    [facility] Creatinine 04/12/2025 0.6  0.5 - 0.8 mg/dL Final    [facility] ALB 04/12/2025 3.0 (L)  3.4 - 5.0 g/dL Final    [facility] Corrected Calcium 04/12/2025 10.3  8.5 - 10.5 mg/dL Final    [facility] CO2, venous 04/12/2025 23.0  20.0 - 31.0 mmol/L Final    [facility] K 04/12/2025 4.3  3.5 - 5.1 mmol/L Final    [facility] Calculated Osmolality 04/12/2025 291  275 - 295 mOsm/kg Final    [facility] BUN 04/12/2025 17  9 - 23 mg/dL Final    [facility] Calcium 04/12/2025 9.5  8.7 - 10.4 mg/dL Final    [facility] Glomerular Filtration R* 04/12/2025 >60  mL/min/1.73m? Final    [facility] Glucose 04/12/2025 152 (H)  74 - 106 mg/dL Final    [facility] Chloride 04/12/2025 111 (H)  98 - 107 mmol/L Final    [facility] Phosphorus 04/12/2025 4.4  2.0 - 5.1 mg/dL Final       Radiology: Reviewed imaging in powerchart.  CT lung screening low dose    Result Date: 4/3/2023  CT  OF THE CHEST  CLINICAL INDICATION:  Former smoker. Screening. FINDINGS:  Serial axial images from the thoracic inlet to the upper abdomen were obtained without administration of contrast with low-dose screen technique.   Comparison is made to prior CT obtained on 7/6/2022. Coronal and sagittal reformatted images were generated from the axial data set. This exam was performed according to our departmental dose optimization program and includes the following measures when applicable: Automated exposure control, adjustment of the MAS and or KVP according to the patient size and are exam and an iterative reconstruction algorithm. The heart and mediastinal vascular structures are  again remarkable for the presence of calcified plaques in the coronary arteries and thoracic aorta..  There are no axillary, hilar or mediastinal abnormally enlarged lymph nodes.  There is no evidence of mediastinal masses. Again the lungs are  hyperexpanded and hyperlucent with flattening of the diaphragms and increased retrosternal space. . Small focal area of subsegmental atelectasis in the posterior right lung base is not identified.  There is no evidence of  pleural effusion.  The pulmonary vasculature is normal.  There are no intrapulmonary nodules or masses.  The airways are normal. However, the right lower lobe posterior basal segmental bronchi is completely filled and occluded most likely representing a mucous plug. This finding was not present on previous CT. Within the limitations of the exam the visualize segment of the upper abdomen is unremarkable. No evidence of osteoblastic or osteolytic lesions is identified in bones of the chest. Again mild osteoarthritic changes of the thoracic spine are noted. IMPRESSION: Stable findings of centrilobular emphysema. Occlusion of the posterior basal segmental bronchus of the right lower lobe most likely representing mucous plug. There is secondary small focal area of subsegmental atelectasis at the lung base. Clinical correlation is suggested to determine the significance of this finding. Stable calcified atherosclerosis of the coronary arteries and thoracic aorta. Nodules seen on previous CT in the right lower lobe, is no longer seen. Lung RADS category 1. Recommendations: Continue yearly screening with LD CT Electronically signed by:  Taylor Garay MD  4/5/2023 9:38 AM CDT Workstation: [id number] Technologist:  EMIGDIO DIETZ Dictated By:   TAYLOR GARAY MD Signed By:     TAYLOR GARAY MD Signed Out:    04/05/23 10:38:56      No family history on file.  Social History     Socioeconomic History    Marital status:    Tobacco Use    Smoking status: Former     Current packs/day: 1.50     Average packs/day: 1.5 packs/day for 15.0 years (22.5 ttl pk-yrs)     Types: Cigarettes    Smokeless tobacco: Never   Substance and Sexual Activity    Alcohol use: Never    Drug use: Never     Past Medical History:    Diagnosis Date    Unspecified systolic (congestive) heart failure 2018    Systolic CHF     Past Surgical History:   Procedure Laterality Date    CATARACT EXTRACTION  2016    Cataract Surgery     SECTION, CLASSIC  10/29/2014     Section    CORONARY ANGIOPLASTY WITH STENT PLACEMENT  2018    Cath Placement Of Stent 1    LYMPH NODE BIOPSY  2016    Biopsy Lymph Node       Charting was completed using voice recognition technology and may include unintended errors.

## 2025-08-08 ENCOUNTER — TELEPHONE (OUTPATIENT)
Dept: PHARMACY | Facility: HOSPITAL | Age: 65
End: 2025-08-08
Payer: MEDICARE

## 2025-08-08 NOTE — TELEPHONE ENCOUNTER
Merissa English has been approved for prior authorization for Trulicity . Patient has been contacted regarding the status of their prior authorization.     Key: [id number]  Approval Duration: 4 months   Date of expiration: 12/31/25    Plan  CONTINUE Trulicity 3 mg once weekly injection  Informed patient there is a prescription on file at [id number] [place], [place]  CONTINUE all other pharmacotherapy  CONTINUE making healthy diet and lifestyle choices  Provided patient with [facility] phone number for any additional questions or concerns [phone]    Time spent with pt: Total length of time 5 (minutes) of the encounter and more than 50% was spent counseling the patient.    Continue all meds under the continuation of care with the referring provider and clinical pharmacy team.    Please reach out to the Clinical Pharmacy Team if there are any further questions.     Verbal consent to manage patient's drug therapy was obtained from patient. They were informed they may decline to participate or withdraw from participation in pharmacy services at any time.    Gabriela Key, PharmD  Clinical Pharmacy Specialist   [phone]

## 2025-08-11 ENCOUNTER — TELEPHONE (OUTPATIENT)
Dept: PRIMARY CARE | Facility: CLINIC | Age: 65
End: 2025-08-11
Payer: MEDICARE